# Patient Record
Sex: FEMALE | Race: WHITE
[De-identification: names, ages, dates, MRNs, and addresses within clinical notes are randomized per-mention and may not be internally consistent; named-entity substitution may affect disease eponyms.]

---

## 2018-07-31 NOTE — EKG
Test Date:    2018-07-31               Test Time:    01:22:52

Technician:   NILSA                                     

                                                     

MEASUREMENT RESULTS:                                       

Intervals:                                           

Rate:         80                                     

OH:           164                                    

QRSD:         80                                     

QT:           378                                    

QTc:          435                                    

Axis:                                                

P:            19                                     

OH:           164                                    

QRS:          4                                      

T:            19                                     

                                                     

INTERPRETIVE STATEMENTS:                                       

                                                     

Normal sinus rhythm

Normal ECG

Compared to ECG 03/09/1999 20:03:00

No significant changes



Electronically Signed On 07-31-18 14:45:21 CDT by Juan Candelaria

## 2018-07-31 NOTE — EDPHYS
Physician Documentation                                                                           

 [facility]                                                                

Name: Nora Elizondo                                                                          

Age: 22 yrs                                                                                       

Sex: Female                                                                                       

: 1995                                                                                   

MRN: [id number]                                                                                   

Arrival Date: 2018                                                                          

Time: 00:34                                                                                       

Account#: [id number]                                                                            

Bed 14                                                                                            

Private MD: Jonh Woods                                                                          

ED Physician Fortino Mcdermott                                                                      

HPI:                                                                                              

                                                                                             

01:16 This 22 yrs old  Female presents to ER via Ambulatory with complaints of Chest jr8 

      Pain, Cough.                                                                                

01:16 The patient or guardian reports cough, that is intermittent, described as mild, with no jr8 

      sputum. Onset: The symptoms/episode began/occurred gradually, 2 day(s) ago. Severity of     

      symptoms: At their worst the symptoms were moderate, in the emergency department the        

      symptoms are unchanged. Modifying factors: The symptoms are alleviated by nothing, the      

      symptoms are aggravated by nothing. Associated signs and symptoms: Pertinent positives:     

      chest pain, sore throat. The patient has not experienced similar symptoms in the past.      

      The patient has not recently seen a physician.                                              

                                                                                                  

OB/GYN:                                                                                           

01:02 LMP N/A - Birth control method                                                          aa1 

                                                                                                  

Historical:                                                                                       

- Allergies:                                                                                      

01:02 No Known Allergies;                                                                     aa1 

- Home Meds:                                                                                      

01:02 None [Active];                                                                          aa1 

- PMHx:                                                                                           

01:02 None;                                                                                   aa1 

- PSHx:                                                                                           

01:02 None;                                                                                   aa1 

                                                                                                  

- Immunization history:: Flu vaccine is not up to date.                                           

- Social history:: Smoking status: Patient/guardian denies using tobacco.                         

- Ebola Screening: : Patient denies exposure to infectious person Patient denies travel           

  to an Ebola-affected area in the 21 days before illness onset.                                  

                                                                                                  

                                                                                                  

ROS:                                                                                              

01:16 Eyes: Negative for injury, pain, redness, and discharge, Neck: Negative for injury,     jr8 

      pain, and swelling, Abdomen/GI: Negative for abdominal pain, nausea, vomiting,              

      diarrhea, and constipation, Back: Negative for injury and pain, MS/Extremity: Negative      

      for injury and deformity, Skin: Negative for injury, rash, and discoloration, Neuro:        

      Negative for headache, weakness, numbness, tingling, and seizure.                           

01:16 ENT: Positive for sore throat, Negative for drainage from ear(s), ear pain, tinnitus,       

      nasal discharge, rhinorrhea, sinus congestion, sinus pain, difficulty swallowing,           

      difficulty handling secretions, hoarseness.                                                 

01:16 Cardiovascular: Positive for chest pain, Negative for edema, orthopnea, palpitations,       

      paroxysmal nocturnal dyspnea.                                                               

01:16 Respiratory: Positive for cough, with no reported sputum, Negative for dyspnea on           

      exertion, hemoptysis, shortness of breath, sputum production, wheezing.                     

                                                                                                  

Exam:                                                                                             

02:09 Eyes:  Pupils equal round and reactive to light, extra-ocular motions intact.  Lids and jr8 

      lashes normal.  Conjunctiva and sclera are non-icteric and not injected.  Cornea within     

      normal limits.  Periorbital areas with no swelling, redness, or edema. ENT:  Nares          

      patent. No nasal discharge, no septal abnormalities noted.  Tympanic membranes are          

      normal and external auditory canals are clear.  Oropharynx with no redness, swelling,       

      or masses, exudates, or evidence of obstruction, uvula midline.  Mucous membranes           

      moist. Neck:  Trachea midline, no thyromegaly or masses palpated, and no cervical           

      lymphadenopathy.  Supple, full range of motion without nuchal rigidity, or vertebral        

      point tenderness.  No Meningismus. Cardiovascular:  Regular rate and rhythm with a          

      normal S1 and S2.  No gallops, murmurs, or rubs.  Normal PMI, no JVD.  No pulse             

      deficits. Respiratory:  Lungs have equal breath sounds bilaterally, clear to                

      auscultation and percussion.  No rales, rhonchi or wheezes noted.  No increased work of     

      breathing, no retractions or nasal flaring. Abdomen/GI:  Soft, non-tender, with normal      

      bowel sounds.  No distension or tympany.  No guarding or rebound.  No evidence of           

      tenderness throughout. Back:  No spinal tenderness.  No costovertebral tenderness.          

      Full range of motion. Skin:  Warm, dry with normal turgor.  Normal color with no            

      rashes, no lesions, and no evidence of cellulitis. MS/ Extremity:  Pulses equal, no         

      cyanosis.  Neurovascular intact.  Full, normal range of motion. Neuro:  Awake and           

      alert, GCS 15, oriented to person, place, time, and situation.  Cranial nerves II-XII       

      grossly intact.  Motor strength 5/5 in all extremities.  Sensory grossly intact.            

      Cerebellar exam normal.  Normal gait.                                                       

                                                                                                  

Vital Signs:                                                                                      

01:02  / 78; Pulse 91; Resp 18; Temp 98.7(O); Pulse Ox 100% on R/A; Weight 129.27 kg;   aa1 

      Height 5 ft. 5 in. (165.10 cm); Pain 6/10;                                                  

01:44  / 86; Pulse 83; Resp 18; Pulse Ox 98% on R/A;                                    aa1 

02:15  / 70; Pulse 74; Resp 18; Temp 98(O); Pulse Ox 98% on R/A; Pain 0/10;             aa1 

01:02 Body Mass Index 47.43 (129.27 kg, 165.10 cm)                                            1 

                                                                                                  

MDM:                                                                                              

00:43 Patient medically screened.                                                             8 

02:09 Data reviewed: vital signs, nurses notes, EKG, radiologic studies, plain films, and as  jr8 

      a result, I will discharge patient. Data interpreted: Pulse oximetry: on room air is 98     

      %. Interpretation: normal. Counseling: I had a detailed discussion with the patient         

      and/or guardian regarding: the historical points, exam findings, and any diagnostic         

      results supporting the discharge/admit diagnosis, radiology results, the need for           

      outpatient follow up, a family practitioner, to return to the emergency department if       

      symptoms worsen or persist or if there are any questions or concerns that arise at home.    

                                                                                                  

                                                                                             

01:43 Order name: Urine Dipstick--Ancillary (enter results)                                   [facility] 

                                                                                             

01:45 Order name: Urine Pregnancy--Ancillary (enter results)                                  [facility] 

                                                                                             

01:13 Order name: EKG - Nurse/Tech; Complete Time: 01:42                                      [facility] 

                                                                                             

01:13 Order name: XRAY Chest Pa And Lat (2 Views)                                             [facility] 

                                                                                             

01:13 Order name: Urine Pregnancy Test (obtain specimen); Complete Time: 01:42                [facility] 

                                                                                             

01:45 Order name: Urine Pregnancy--Ancillary                                                  [facility]

                                                                                             

01:13 Order name: Urine Dipstick-Ancillary (obtain specimen); Complete Time: 01:42            [facility] 

                                                                                                  

Administered Medications:                                                                         

No medications were administered                                                                  

                                                                                                  

                                                                                                  

Disposition:                                                                                      

20:33 Co-signature as Attending Physician, Fortino Mcdermott MD I agree with the assessment and  wa  

      plan of care.                                                                               

                                                                                                  

Disposition:                                                                                      

18 02:10 Discharged to Home. Impression: Acute upper respiratory infection, unspecified,    

  Chest pain on breathing.                                                                        

- Condition is Stable.                                                                            

- Discharge Instructions: Upper Respiratory Infection, Adult.                                     

- Prescriptions for Prednisone 20 mg Oral Tablet - take 1 tablet by ORAL route once               

  daily for 5 days; 5 tablet. Tessalon Perles 100 mg Oral Capsule - take 1 capsule by             

  ORAL route every 8 hours As needed; 15 capsule. Guaifenesin AC 10- 100 mg/5 mL Oral             

  Liquid - take 10 milliliter by ORAL route every 4 hours As needed; 240 milliliter.              

- Medication Reconciliation Form, Thank You Letter, Antibiotic Education, Prescription            

  Opioid Use form.                                                                                

- Follow up: Jonh Woods MD; When: 5 - 6 days; Reason: Recheck today's complaints,              

  Continuance of care, Re-evaluation by your physician.                                           

- Problem is new.                                                                                 

- Symptoms have improved.                                                                         

                                                                                                  

                                                                                                  

                                                                                                  

Signatures:                                                                                       

Dispatcher [employer]                           EDMS                                                 

Ernestina Orellana RN                        RN   aa1                                                  

Marcelino Durant PA                        PA   jr8                                                  

Fortino Mcdermott MD MD wa                                                   

                                                                                                  

Corrections: (The following items were deleted from the chart)                                    

02:30 02:10 2018 02:10 Discharged to Home. Impression: Acute upper respiratory          aa1 

      infection, unspecified; Chest pain on breathing. Condition is Stable. Forms are             

      Medication Reconciliation Form, Thank You Letter, Antibiotic Education, Prescription        

      Opioid Use. Follow up: Jonh Woods; When: 5 - 6 days; Reason: Recheck today's               

      complaints, Continuance of care, Re-evaluation by your physician. Problem is new.           

      Symptoms have improved. jr8                                                                 

                                                                                                  

**************************************************************************************************

## 2018-07-31 NOTE — ER
Nurse's Notes                                                                                     

 [facility]                                                                

Name: Nroa Elizondo                                                                          

Age: 22 yrs                                                                                       

Sex: Female                                                                                       

: 1995                                                                                   

MRN: [id number]                                                                                   

Arrival Date: 2018                                                                          

Time: 00:34                                                                                       

Account#: [id number]                                                                            

Bed 14                                                                                            

Private MD: Jonh Woods                                                                          

Diagnosis: Acute upper respiratory infection, unspecified;Chest pain on breathing                 

                                                                                                  

Presentation:                                                                                     

                                                                                             

00:59 Presenting complaint: Patient states: dry cough x 2 days and began to have tightness in aa1 

      her chest this evening. Denies fever. Transition of care: patient was not received from     

      another setting of care. Onset of symptoms was 2018. Risk Assessment: Do you       

      want to hurt yourself or someone else? Patient reports no desire to harm self or            

      others. Initial Sepsis Screen: Does the patient meet any 2 criteria? No. Patient's          

      initial sepsis screen is negative. Does the patient have a suspected source of              

      infection? No. Patient's initial sepsis screen is negative. Care prior to arrival: None.    

00:59 Method Of Arrival: Ambulatory                                                           aa1 

00:59 Acuity: FRANK 4                                                                           aa1 

                                                                                                  

OB/GYN:                                                                                           

01:02 LMP N/A - Birth control method                                                          aa1 

                                                                                                  

Historical:                                                                                       

- Allergies:                                                                                      

01:02 No Known Allergies;                                                                     aa1 

- Home Meds:                                                                                      

01:02 None [Active];                                                                          aa1 

- PMHx:                                                                                           

01:02 None;                                                                                   aa1 

- PSHx:                                                                                           

01:02 None;                                                                                   aa1 

                                                                                                  

- Immunization history:: Flu vaccine is not up to date.                                           

- Social history:: Smoking status: Patient/guardian denies using tobacco.                         

- Ebola Screening: : Patient denies exposure to infectious person Patient denies travel           

  to an Ebola-affected area in the 21 days before illness onset.                                  

                                                                                                  

                                                                                                  

Screenin:04 Abuse screen: Denies threats or abuse. Denies injuries from another. Nutritional        aa1 

      screening: No deficits noted. Tuberculosis screening: No symptoms or risk factors           

      identified. Fall Risk None identified.                                                      

                                                                                                  

Assessment:                                                                                       

01:04 Reassessment:. General: Appears in no apparent distress. comfortable, Behavior is calm, aa1 

      cooperative, appropriate for age. Pain: Complains of pain in chest Pain does not            

      radiate. Pain currently is 6 out of 10 on a pain scale. Quality of pain is described as     

      pressure, Is continuous. Neuro: Level of Consciousness is awake, alert, obeys commands,     

      Oriented to person, place, time, situation, Moves all extremities. Full function Gait       

      is steady, Speech is normal. Cardiovascular: Heart tones S1 S2 present Rhythm is            

      regular. Respiratory: Reports cough that is dry, Airway is patent Respiratory effort is     

      even, unlabored, Respiratory pattern is regular, symmetrical, Breath sounds are clear       

      bilaterally. GI: No signs and/or symptoms were reported involving the gastrointestinal      

      system. : No signs and/or symptoms were reported regarding the genitourinary system.      

      EENT: No signs and/or symptoms were reported regarding the EENT system. Derm: Skin is       

      intact, is healthy with good turgor, Skin is pink, warm \T\ dry. Musculoskeletal:           

      Circulation, motion, and sensation intact. Capillary refill < 3 seconds.                    

01:44 Reassessment: Patient appears in no apparent distress at this time. Patient and/or      aa1 

      family updated on plan of care and expected duration. Pain level reassessed. Patient is     

      alert, oriented x 3, equal unlabored respirations, skin warm/dry/pink. Awaiting xray.       

02:20 Reassessment: Patient and/or family updated on plan of care and expected duration. Pain aa1 

      level reassessed. Patient is alert, oriented x 3, equal unlabored respirations, skin        

      warm/dry/pink. Discharge instructions given to patient, verbalized the understanding of     

      instruction.                                                                                

                                                                                                  

Vital Signs:                                                                                      

01:02  / 78; Pulse 91; Resp 18; Temp 98.7(O); Pulse Ox 100% on R/A; Weight 129.27 kg;   aa1 

      Height 5 ft. 5 in. (165.10 cm); Pain 6/10;                                                  

01:44  / 86; Pulse 83; Resp 18; Pulse Ox 98% on R/A;                                    aa1 

02:15  / 70; Pulse 74; Resp 18; Temp 98(O); Pulse Ox 98% on R/A; Pain 0/10;             aa1 

01:02 Body Mass Index 47.43 (129.27 kg, 165.10 cm)                                            aa1 

                                                                                                  

ED Course:                                                                                        

00:34 Patient arrived in ED.                                                                  ds1 

00:34 Jonh Woods MD is Private Physician.                                                  ds1 

00:43 [facility]                                                               jr8 

00:43 Fortino Mcdermott MD is Attending Physician.                                             jr8 

00:53 Bland, Ernestina, RN is Primary Nurse.                                                      aa1 

01:00 Triage completed.                                                                       aa1 

01:02 Arm band placed on right wrist. Patient placed in an exam room, on a stretcher.         aa1 

01:04 Patient has correct armband on for positive identification. Bed in low position. Call   aa1 

      light in reach. Pulse ox on. NIBP on.                                                       

01:04 Patient maintains SpO2 saturation greater than 95% on room air.                         aa1 

01:52 Patient moved to radiology via wheelchair.                                              kw  

01:52 X-ray completed. Patient tolerated procedure well.                                      kw  

01:52 Patient moved back from radiology.                                                      kw  

01:53 XRAY Chest Pa And Lat (2 Views) In Process Unspecified.                                 EDMS

02:10 Jonh Woods MD is Referral Physician.                                                 jr8 

02:20 Patient did not have IV access during this emergency room visit.                        aa1 

02:27 No provider procedures requiring assistance completed.                                  aa1 

                                                                                                  

Administered Medications:                                                                         

No medications were administered                                                                  

                                                                                                  

                                                                                                  

Outcome:                                                                                          

02:10 Discharge ordered by MD.                                                                jr8 

02:27 Discharged to home ambulatory, with family.                                             aa1 

02:27 Condition: improved                                                                         

02:27 Discharge instructions given to patient, family, Instructed on discharge instructions,      

      follow up and referral plans. medication usage, Demonstrated understanding of               

      instructions, follow-up care, medications, Prescriptions given X 3.                         

02:30 Patient left the ED.                                                                    aa1 

                                                                                                  

Signatures:                                                                                       

Dispatcher [employer]                           EDMS                                                 

Ernestina Orellana RN RN   aa1                                                  

Rosi Mccollum                                ds1                                                  

Britney Robles Josh, PA PA   jr8                                                  

                                                                                                  

**************************************************************************************************

## 2018-07-31 NOTE — RAD REPORT
EXAM DESCRIPTION:  RAD - Chest Pa And Lat (2 Views) - 7/31/2018 1:57 am

 

CLINICAL HISTORY:  COUGH

Chest pain.

 

COMPARISON:  No comparisons

 

FINDINGS:  Mild prominence of the interstitial markings noted which could indicate interstitial pneum
onitis or reactive airway disease. No focal consolidation typical of bacterial pneumonia is seen. The
 heart is normal in size. No displaced fractures.

## 2018-08-23 NOTE — ER
Nurse's Notes                                                                                     

 [facility]                                                                

Name: Nora Elizondo                                                                          

Age: 22 yrs                                                                                       

Sex: Female                                                                                       

: 1995                                                                                   

MRN: [id number]                                                                                   

Arrival Date: 2018                                                                          

Time: 13:36                                                                                       

Account#: [id number]                                                                            

Bed 12                                                                                            

Private MD:                                                                                       

Diagnosis: Left Ear Pain;Rash and other nonspecific skin eruption                                 

                                                                                                  

Presentation:                                                                                     

                                                                                             

14:04 Presenting complaint: Patient states: Well a couple days ago my left ear was bothering  iw  

      me, now my right ear is hurting too, i also have a sore throat. Transition of care:         

      patient was not received from another setting of care. Onset of symptoms was 2018. Risk Assessment: Do you want to hurt yourself or someone else? Patient reports no     

      desire to harm self or others. Initial Sepsis Screen: Does the patient meet any 2           

      criteria? No. Patient's initial sepsis screen is negative. Does the patient have a          

      suspected source of infection? No. Patient's initial sepsis screen is negative. Care        

      prior to arrival: None.                                                                     

14:04 Acuity: FRANK 5                                                                           iw  

14:04 Method Of Arrival: Ambulatory                                                           iw  

                                                                                                  

Triage Assessment:                                                                                

14:30 General: Appears in no apparent distress.                                               iw  

14:30 General: Behavior is calm, cooperative.                                                 iw  

                                                                                                  

Historical:                                                                                       

- Allergies:                                                                                      

14:05 No Known Allergies;                                                                     iw  

- Home Meds:                                                                                      

14:05 None [Active];                                                                          iw  

- PMHx:                                                                                           

14:05 None;                                                                                   iw  

- PSHx:                                                                                           

14:04 None;                                                                                   iw  

                                                                                                  

- Immunization history:: Adult Immunizations up to date.                                          

- Social history:: Smoking status: Patient/guardian denies using tobacco.                         

- Ebola Screening: : Patient negative for fever greater than or equal to 101.5 degrees            

  Fahrenheit, and additional compatible Ebola Virus Disease symptoms Patient denies               

  exposure to infectious person Patient denies travel to an Ebola-affected area in the            

  21 days before illness onset No symptoms or risks identified at this time.                      

- Family history:: not pertinent.                                                                 

- Hospitalizations: : No recent hospitalization is reported.                                      

                                                                                                  

                                                                                                  

Screenin:35 Abuse screen: Denies threats or abuse. Denies injuries from another. Nutritional        iw  

      screening: No deficits noted. Tuberculosis screening: No symptoms or risk factors           

      identified. Fall Risk None identified.                                                      

                                                                                                  

Assessment:                                                                                       

14:30 General: Appears in no apparent distress. Behavior is calm, cooperative. Pain:          iw  

      Complains of pain in abdomen and umbilical area and right ear. Neuro: Level of              

      Consciousness is awake, alert, obeys commands, Oriented to person, place, time,             

      situation, Moves all extremities. Full function. Cardiovascular: Patient's skin is warm     

      and dry. Respiratory: Respiratory effort is even, unlabored, Respiratory pattern is         

      regular, symmetrical. EENT: Reports pain in right ear and left ear. Derm: Skin is           

      intact. Musculoskeletal: Range of motion: intact in all extremities.                        

                                                                                                  

Vital Signs:                                                                                      

14:20  / 85; Pulse 74; Resp 16; Temp 98.2; Pulse Ox 98% on R/A; Pain 5/10;              iw  

                                                                                                  

ED Course:                                                                                        

13:36 Patient arrived in ED.                                                                  rg4 

13:46 [facility]                                                           kav 

13:46 Fortino Mcdermott MD is Attending Physician.                                             kav 

14:03 Anel Meadows, RN is Primary Nurse.                                                   iw  

14:04 Triage completed.                                                                       iw  

14:05 Arm band placed on.                                                                     iw  

14:30 Patient has correct armband on for positive identification.                             iw  

14:30 No provider procedures requiring assistance completed. Patient did not have IV access   iw  

      during this emergency room visit.                                                           

                                                                                                  

Administered Medications:                                                                         

No medications were administered                                                                  

                                                                                                  

                                                                                                  

Outcome:                                                                                          

14:22 Discharge ordered by MD.                                                                kav 

14:39 Discharged to home ambulatory, with family.                                             iw  

14:39 Condition: good                                                                             

14:39 Discharge instructions given to patient, family, Instructed on discharge instructions,      

      follow up and referral plans. medication usage, Demonstrated understanding of               

      instructions, follow-up care, medications, Prescriptions given X 1.                         

14:40 Patient left the ED.                                                                    iw  

                                                                                                  

Signatures:                                                                                       

Liana Vega FNP FNP kav Williams, Irene, RN                     Elida Purvis                                 rg4                                                  

                                                                                                  

**************************************************************************************************

## 2018-08-23 NOTE — EDPHYS
Physician Documentation                                                                           

 [facility]                                                                

Name: Nora Elizondo                                                                          

Age: 22 yrs                                                                                       

Sex: Female                                                                                       

: 1995                                                                                   

MRN: [id number]                                                                                   

Arrival Date: 2018                                                                          

Time: 13:36                                                                                       

Account#: [id number]                                                                            

Bed 12                                                                                            

Private MD:                                                                                       

ED Physician Fortino Mcdermott                                                                      

HPI:                                                                                              

                                                                                             

13:47 This 22 yrs old  Female presents to ER via Unassigned with complaints of Ear   kav 

      Pain.                                                                                       

14:14 The patient presents with a fullness. The complaints affect the right ear. Onset: The   kav 

      symptoms/episode began/occurred acutely, 2 day(s) ago. Modifying factors: The symptoms      

      are alleviated by covering ear, the symptoms are aggravated by "...putting q-tips in        

      ear". Associated signs and symptoms: The patient has no apparent associated signs or        

      symptoms. Severity of symptoms: At their worst the symptoms were moderate just prior to     

      arrival. The patient has not experienced similar symptoms in the past. The patient has      

      not recently seen a physician. Patient reports that she also has a rash on her abdomen      

      around periumbilical area..                                                                 

                                                                                                  

Historical:                                                                                       

- Allergies:                                                                                      

14:05 No Known Allergies;                                                                     iw  

- Home Meds:                                                                                      

14:05 None [Active];                                                                          iw  

- PMHx:                                                                                           

14:05 None;                                                                                   iw  

- PSHx:                                                                                           

14:04 None;                                                                                   iw  

                                                                                                  

- Immunization history:: Adult Immunizations up to date.                                          

- Social history:: Smoking status: Patient/guardian denies using tobacco.                         

- Ebola Screening: : Patient negative for fever greater than or equal to 101.5 degrees            

  Fahrenheit, and additional compatible Ebola Virus Disease symptoms Patient denies               

  exposure to infectious person Patient denies travel to an Ebola-affected area in the            

  21 days before illness onset No symptoms or risks identified at this time.                      

- Family history:: not pertinent.                                                                 

- Hospitalizations: : No recent hospitalization is reported.                                      

                                                                                                  

                                                                                                  

ROS:                                                                                              

14:16 Constitutional: Negative for fever, chills, and weight loss, Eyes: Negative for injury, kav 

      pain, redness, and discharge, Neck: Negative for injury, pain, and swelling,                

      Cardiovascular: Negative for chest pain, palpitations, and edema, Respiratory: Negative     

      for shortness of breath, cough, wheezing, and pleuritic chest pain, Abdomen/GI:             

      Negative for abdominal pain, nausea, vomiting, diarrhea, and constipation, Back:            

      Negative for injury and pain, : Negative for injury, bleeding, discharge, and             

      swelling, MS/Extremity: Negative for injury and deformity, Neuro: Negative for              

      headache, weakness, numbness, tingling, and seizure, Psych: Negative for depression,        

      anxiety, suicide ideation, homicidal ideation, and hallucinations, Allergy/Immunology:      

      Negative for hives, rash, and allergies, Endocrine: Negative for neck swelling,             

      polydipsia, polyuria, polyphagia, and marked weight changes, Hematologic/Lymphatic:         

      Negative for swollen nodes, abnormal bleeding, and unusual bruising.                        

14:16 ENT: Positive for ear pain, of the right ear, Negative for sinus congestion.                

14:16 Skin: Positive for rash.                                                                    

                                                                                                  

Exam:                                                                                             

14:16 Constitutional:  This is a well developed, well nourished patient who is awake, alert,  kav 

      and in no acute distress. Head/Face:  Normocephalic, atraumatic. Eyes:  Pupils equal        

      round and reactive to light, extra-ocular motions intact.  Lids and lashes normal.          

      Conjunctiva and sclera are non-icteric and not injected.  Cornea within normal limits.      

      Periorbital areas with no swelling, redness, or edema. Neck:  Trachea midline, no           

      thyromegaly or masses palpated, and no cervical lymphadenopathy.  Supple, full range of     

      motion without nuchal rigidity, or vertebral point tenderness.  No Meningismus.             

      Chest/axilla:  Normal chest wall appearance and motion.  Nontender with no deformity.       

      No lesions are appreciated. Cardiovascular:  Regular rate and rhythm with a normal S1       

      and S2.  No gallops, murmurs, or rubs.  Normal PMI, no JVD.  No pulse deficits.             

      Respiratory:  Lungs have equal breath sounds bilaterally, clear to auscultation and         

      percussion.  No rales, rhonchi or wheezes noted.  No increased work of breathing, no        

      retractions or nasal flaring. Abdomen/GI:  Soft, non-tender, with normal bowel sounds.      

      No distension or tympany.  No guarding or rebound.  No evidence of tenderness               

      throughout. Back:  No spinal tenderness.  No costovertebral tenderness.  Full range of      

      motion. MS/ Extremity:  Pulses equal, no cyanosis.  Neurovascular intact.  Full, normal     

      range of motion. Neuro:  Awake and alert, GCS 15, oriented to person, place, time, and      

      situation.  Cranial nerves II-XII grossly intact.  Motor strength 5/5 in all                

      extremities.  Sensory grossly intact.  Cerebellar exam normal.  Normal gait. Psych:         

      Awake, alert, with orientation to person, place and time.  Behavior, mood, and affect       

      are within normal limits.                                                                   

14:16 ENT: External ear(s): are unremarkable, no acute changes, Ear canal(s): TM's: erythema,     

      that is moderate, on the right, Examination of the other ear shows no obvious               

      abnormality, Nose: is normal, no acute changes, Examination of the other nostril shows      

      no obvious abnormality.                                                                     

14:16 Skin: ringworm, on the umbilical area.                                                      

                                                                                                  

Vital Signs:                                                                                      

14:20  / 85; Pulse 74; Resp 16; Temp 98.2; Pulse Ox 98% on R/A; Pain 5/10;              iw  

                                                                                                  

MDM:                                                                                              

13:47 Medical screening is not applicable.                                                    kav 

14:16 Data reviewed: vital signs, nurses notes.                                               kav 

                                                                                                  

Administered Medications:                                                                         

No medications were administered                                                                  

                                                                                                  

                                                                                                  

Disposition:                                                                                      

18 14:22 Discharged to Home. Impression: Left Ear Pain, Rash and other nonspecific skin     

  eruption.                                                                                       

- Condition is Stable.                                                                            

- Discharge Instructions: Earache, Adult, Rash, Body Ringworm.                                    

- Prescriptions for Ciprodex 0.3- 0.1 % Otic Drops, Suspension - instill 4 drop by [facility]           

  route every 12 hours for 7 days , for ears ONLY; 1 Container.                                   

- Medication Reconciliation Form, Thank You Letter, Antibiotic Education, Prescription            

  Opioid Use form.                                                                                

- Follow up: Private Physician; When: 2 - 3 days; Reason: Recheck today's complaints,             

  Continuance of care, Re-evaluation by your physician.                                           

- Problem is new.                                                                                 

- Symptoms have improved.                                                                         

                                                                                                  

                                                                                                  

                                                                                                  

Addendum:                                                                                         

2018                                                                                        

     08:07 Co-signature as Attending Physician, Fortino Mcdermott MD I agree with the assessment and  w
a

           plan of care.                                                                          

                                                                                                  

Signatures:                                                                                       

Liana Vega, FELIPEP                    FNP  Anel Benitez RN RN iw [place], William, MD MD   wa                                                   

                                                                                                  

Corrections: (The following items were deleted from the chart)                                    

                                                                                             

14:40 14:22 2018 14:22 Discharged to Home. Impression: Left Ear Pain; Rash and other    iw  

      nonspecific skin eruption. Condition is Stable. Forms are Medication Reconciliation         

      Form, Thank You Letter, Antibiotic Education, Prescription Opioid Use. Follow up:           

      Private Physician; When: 2 - 3 days; Reason: Recheck today's complaints, Continuance of     

      care, Re-evaluation by your physician. Problem is new. Symptoms have improved. deovnte          

                                                                                                  

**************************************************************************************************

## 2019-09-09 NOTE — XMS REPORT
Summary of Care

 Created on:2019



Patient:Nora Elizondo

Sex:Female

:1995

External Reference #:[id number]





Demographics







 Address  [address] [place] [address]

 

 Phone  [phone]

 

 Mobile Phone  [phone]

 

 Home Phone  [phone]

 

 Email Address  [email or web address]

 

 Preferred Language  English

 

 Marital Status  Single

 

 Mandaeism Affiliation  Unknown

 

 Race  White

 

 Ethnic Group  Not  or 









Author







 Organization  [facility]

 

 Address  [address] [place] [address]









Care Team Providers







 Name  Role  Phone

 

 [facility]  Primary Care Provider  [phone]









Encounter Details







 Date  Type  Department  Care Team  Description

 

 2019  Orders Only  [facility]



  Doctor Unassigned, No  



     [address] [facility]



  Name



  



     [place] [address]  [address] [place] [address]  







Allergies

No Known Allergiesdocumented as of this encounter (statuses as of 2019)



Medications







 Medication  Sig  Dispensed  Refills  Start Date  End Date  Status

 

 norgestimate-ethinyl  Take 1 tablet by    0      Active



 estradiol (FEMYNOR)  mouth daily.          



 0.25-35 mg-mcg per            



 tablet            



documented as of this encounter (statuses as of 2019)



Active Problems







 Problem  Noted Date

 

 Nexplanon in place  2018









 Overview: 







 Removal date 21









 Need for varicella vaccine  2018

 

 Dysmenorrhea  2016

 

 Morbid obesity  2013









 Overview: 







 ICD10 Diagnosis Term Replacer Utility



documented as of this encounter (statuses as of 2019)



Resolved Problems







 Problem  Noted Date  Resolved Date

 

 Well woman exam  2016

 

 Contraceptive management  2016

 

 Depot contraception  2016



documented as of this encounter (statuses as of 2019)



Immunizations







 Name  Administration Dates  Next Due

 

 HPV9  2018  

 

 TDAP (ADACEL) VACCINE  2012  



documented as of this encounter



Social History







 Tobacco Use  Types  Packs/Day  Years Used  Date

 

 Never Smoker        









 Smokeless Tobacco: Never Used      









 Alcohol Use  Drinks/Week  oz/Week  Comments

 

 No  0 Standard drinks or equivalent  0.0  









 Sex Assigned at Birth  Date Recorded

 

 Not on file  









 Job Start Date  Occupation  Industry

 

 Not on file  Not on file  Not on file









 Travel History  Travel Start  Travel End









 No recent travel history available.



documented as of this encounter



Last Filed Vital Signs

Not on filedocumented in this encounter



Plan of Treatment







 Date  Type  Specialty  Care Team  Description

 

 2019  Office Visit  [facility]  Rea Reagan, FNP



  



       [address] [place], [place] [address] [phone] (Fax)  









 Health Maintenance  Due Date  Last Done  Comments

 

 MENINGOCOCCAL B VACCINES (1  2007    



 of 2 - Risk Bexsero 2-dose      



 series)      

 

 VARICELLA VACCINES (1 of 2 -  2010    



 13+ 2-dose series)      

 

 HPV VACCINES (2 - Female  2018  



 3-dose series)      

 

 PAP SMEAR  2018    

 

 CHLAMYDIA SCREENING  2019, 12/15/2016,  



     2016, Additional  



     history exists  

 

 INFLUENZA VACCINE  2019    

 

 DTaP,Tdap,and Td Vaccines (2  2022  



 - Td)      

 

 MENINGOCOCCAL VACCINE  Aged Out    No longer eligible



       based on patient's age



       to complete this topic

 

 PNEUMOCOCCAL 0-64 YEARS  Aged Out    No longer eligible



 COMBINED SERIES      based on patient's age



       to complete this topic



documented as of this encounter



Procedures







 Procedure Name  Priority  Date/Time  Associated Diagnosis  Comments

 

 ASSIGNMENT OF BENEFITS  Routine  2019  8:00 AM CDT    

 

 ASSIGNMENT OF BENEFITS  Routine  2019  8:00 AM CDT    



documented in this encounter



Results

Not on filedocumented in this encounter



Insurance







 Payer  Benefit Plan  Subscriber ID  Effective Dates  Phone  Address  Type



   / Group          

 

 [facility]  [id number]  2018-Preeti  [phone]  [address]  
PP[address]/POS



 [place]  - OUT OF      [address]  [id number]



  



   [place]  



           [address]  



documented as of this encounter



Advance Directives







 Name  Relationship  Healthcare Agent Relationship  Communication

 

 Kia Elizondo  Grandparent  Primary healthcare agent  [phone] (Mobile)

## 2019-09-09 NOTE — EDPHYS
Physician Documentation                                                                           

 [facility]                                                                 

Name: Nora Elizondo                                                                          

Age: 23 yrs                                                                                       

Sex: Female                                                                                       

: 1995                                                                                   

MRN: [id number]                                                                                   

Arrival Date: 2019                                                                          

Time: 07:19                                                                                       

Account#: [id number]                                                                            

Bed 18                                                                                            

Private MD: Jonh Woods                                                                          

ED Physician Manny Russo                                                                         

HPI:                                                                                              

                                                                                             

07:28 This 23 yrs old  Female presents to ER via Unassigned with complaints of       rn  

      Vaginal Pain.                                                                               

07:28 The patient presents with perineal itching. Onset: The symptoms/episode began/occurred  rn  

      2 day(s) ago. Modifying factors: The symptoms are alleviated by nothing, the symptoms       

      are aggravated by movement, pressure, urinating. Severity of symptoms: At their worst       

      the symptoms were moderate, in the emergency department the symptoms are unchanged. The     

      patient has not experienced similar symptoms in the past. The patient has not recently      

      seen a physician. Reports vaginal pain and itching, began 2 days ago, no fever, no          

      trauma, reports began with itching and now has worsened to hurt when urinating and          

      walking/sitting. .                                                                          

                                                                                                  

Historical:                                                                                       

- Allergies:                                                                                      

07:35 No Known Allergies;                                                                     ss  

- Home Meds:                                                                                      

07:35 None [Active];                                                                          ss  

- PMHx:                                                                                           

07:35 None;                                                                                   ss  

- PSHx:                                                                                           

07:35 None;                                                                                   ss  

                                                                                                  

- Immunization history:: Adult Immunizations up to date.                                          

- Social history:: Smoking status: Patient/guardian denies using tobacco.                         

- Family history:: not pertinent.                                                                 

- Ebola Screening: : Patient denies exposure to infectious person Patient denies travel           

  to an Ebola-affected area in the 21 days before illness onset.                                  

- Hospitalizations: : No recent hospitalization is reported.                                      

                                                                                                  

                                                                                                  

ROS:                                                                                              

07:31 Constitutional: Negative for fever, chills, and weight loss, Eyes: Negative for injury, rn  

      pain, redness, and discharge, Cardiovascular: Negative for chest pain, palpitations,        

      and edema, Respiratory: Negative for shortness of breath, cough, wheezing, and              

      pleuritic chest pain, Abdomen/GI: Negative for abdominal pain, nausea, vomiting,            

      diarrhea, and constipation, : Negative for injury, swelling MS/Extremity: Negative        

      for injury and deformity, Skin: Negative for injury, rash, and discoloration, Neuro:        

      Negative for headache, weakness, numbness, tingling, and seizure.                           

                                                                                                  

Exam:                                                                                             

07:31 Constitutional:  This is a well developed, well nourished patient who is awake, alert,  rn  

      and in no acute distress. Abdomen/GI:  soft, non-tender, no rebound Pelvic Exam:  +         

      inflamed and erythematous vaginal mucosa and labia without thick discharge, no foul         

      odor. NO signs of trauma.  No abscess or fluctuance. Skin:  Warm, dry with normal           

      turgor.  Normal color with no rashes, no lesions, and no evidence of cellulitis.            

                                                                                                  

Vital Signs:                                                                                      

07:35  / 89; Pulse 80; Resp 16; Temp 98.3(TE); Pulse Ox 98% on R/A; Weight 131.54 kg;   ss  

      Height 5 ft. 5 in. (165.10 cm); Pain 8/10;                                                  

07:35 Body Mass Index 48.26 (131.54 kg, 165.10 cm)                                            ss  

                                                                                                  

MDM:                                                                                              

07:20 Patient medically screened.                                                             rn  

08:11 Differential diagnosis: urinary tract infection, vaginosis, vaginitis. Data reviewed:   rn  

      vital signs, nurses notes.                                                                  

08:12 Counseling: I had a detailed discussion with the patient and/or guardian regarding: the rn  

      historical points, exam findings, and any diagnostic results supporting the                 

      discharge/admit diagnosis, lab results, the need for outpatient follow up, to return to     

      the emergency department if symptoms worsen or persist or if there are any questions or     

      concerns that arise at home. Special discussion: I discussed with the patient/guardian      

      in detail that at this point there is no indication for admission to the hospital. It       

      is understood, however, that if the symptoms persist or worsen the patient needs to         

      return immediately for re-evaluation.                                                       

                                                                                                  

                                                                                             

07:50 Order name: Urine Dipstick--Ancillary (enter results); Complete Time: 08:23             bd  

                                                                                             

07:50 Order name: Urine Pregnancy--Ancillary (enter results); Complete Time: 08:23            bd  

                                                                                             

07:31 Order name: Urine Dipstick-Ancillary (obtain specimen); Complete Time: 07:49            rn  

                                                                                             

07:31 Order name: Urine Pregnancy Test (obtain specimen); Complete Time: 07:49                rn  

                                                                                                  

Administered Medications:                                                                         

07:45 CANCELLED (will wait on preg test): DiFLUcan 150 mg PO once                             rn  

08:28 Drug: DiFLUcan 200 mg Route: PO;                                                        ss  

08:28 Follow up: Response: Medication administered at discharge.                              ss  

                                                                                                  

                                                                                                  

Disposition:                                                                                      

19 08:12 Discharged to Home. Impression: Vaginitis, vulvitis and vulvovaginitis in          

  diseases classified elsewhere.                                                                  

- Condition is Stable.                                                                            

- Discharge Instructions: Vaginitis.                                                              

- Prescriptions for Flagyl 500 mg Oral Tablet - take 1 tablet by ORAL route every 12              

  hours for 7 days; 14 tablet. Miconazole 7 2 % Vaginal Cream - insert 1 applicatorful            

  by VAGINAL route At bedtime for 7 days; 45 gram.                                                

- Medication Reconciliation Form, Thank You Letter, Antibiotic Education, Prescription            

  Opioid Use form.                                                                                

- Follow up: Private Physician; When: 2 - 3 days; Reason: Recheck today's complaints,             

  Re-evaluation by your physician.                                                                

- Problem is new.                                                                                 

- Symptoms are unchanged.                                                                         

                                                                                                  

                                                                                                  

                                                                                                  

Signatures:                                                                                       

Dispatcher [employer]                           EDMS                                                 

Manny Russo MD MD rn Smirch, Shelby, RN RN   ss                                                   

                                                                                                  

Corrections: (The following items were deleted from the chart)                                    

07:41 07:31 Constitutional: This is a well developed, well nourished patient who is awake,    rn  

      alert, and in no acute distress. Abdomen/GI: soft, non-tender, no rebound Pelvic Exam:      

      + inflamed and erythematous vaginal mucosa without thick discharge, no foul odor. NO        

      signs of trauma. Skin: Warm, dry with normal turgor. Normal color with no rashes, no        

      lesions, and no evidence of cellulitis. rn                                                  

07:45 07:45 DiFLUcan 150 mg PO once ordered. rn                                               rn  

08:28 08:12 2019 08:12 Discharged to Home. Impression: Vaginitis, vulvitis and          ss  

      vulvovaginitis in diseases classified elsewhere. Condition is Stable. Discharge             

      Instructions: Vaginitis. Prescriptions for Flagyl 500 mg Oral Tablet - take 1 tablet by     

      ORAL route every 12 hours for 7 days; 14 tablet, Miconazole 7 2 % Vaginal Cream -           

      insert 1 applicatorful by VAGINAL route At bedtime for 7 days; 45 gram. and Forms are       

      Medication Reconciliation Form, Thank You Letter, Antibiotic Education, Prescription        

      Opioid Use. Follow up: Private Physician; When: 2 - 3 days; Reason: Recheck today's         

      complaints, Re-evaluation by your physician. Problem is new. Symptoms are unchanged. rn     

                                                                                                  

**************************************************************************************************
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
## 2019-09-09 NOTE — ER
Nurse's Notes                                                                                     

 [facility]                                                                 

Name: Nora Elizondo                                                                          

Age: 23 yrs                                                                                       

Sex: Female                                                                                       

: 1995                                                                                   

MRN: [id number]                                                                                   

Arrival Date: 2019                                                                          

Time: 07:19                                                                                       

Account#: [id number]                                                                            

Bed 18                                                                                            

Private MD: Jonh Woods                                                                          

Diagnosis: Vaginitis, vulvitis and vulvovaginitis in diseases classified elsewhere                

                                                                                                  

Presentation:                                                                                     

                                                                                             

07:19 Presenting complaint: Patient states: vaginal itching x 3 days. Patient is concerned    ss  

      because she believes she may have scratched herself because it burns when she voids x 1     

      day. Transition of care: patient was not received from another setting of care. Onset       

      of symptoms was 2019. Risk Assessment: Do you want to hurt yourself or        

      someone else? Patient reports no desire to harm self or others. Initial Sepsis Screen:      

      Does the patient meet any 2 criteria? No. Patient's initial sepsis screen is negative.      

      Does the patient have a suspected source of infection? No. Patient's initial sepsis         

      screen is negative. Care prior to arrival: None.                                            

07:19 Method Of Arrival: Ambulatory                                                           ss  

07:19 Acuity: FRANK 4                                                                           ss  

                                                                                                  

Historical:                                                                                       

- Allergies:                                                                                      

07:35 No Known Allergies;                                                                     ss  

- Home Meds:                                                                                      

07:35 None [Active];                                                                          ss  

- PMHx:                                                                                           

07:35 None;                                                                                   ss  

- PSHx:                                                                                           

07:35 None;                                                                                   ss  

                                                                                                  

- Immunization history:: Adult Immunizations up to date.                                          

- Social history:: Smoking status: Patient/guardian denies using tobacco.                         

- Family history:: not pertinent.                                                                 

- Ebola Screening: : Patient denies exposure to infectious person Patient denies travel           

  to an Ebola-affected area in the 21 days before illness onset.                                  

- Hospitalizations: : No recent hospitalization is reported.                                      

                                                                                                  

                                                                                                  

Screenin:40 Abuse screen: Denies threats or abuse. Denies injuries from another. Nutritional        ss  

      screening: No deficits noted. Tuberculosis screening: Never had TB. Fall Risk None          

      identified.                                                                                 

                                                                                                  

Assessment:                                                                                       

07:19 General: Appears in no apparent distress. comfortable, Behavior is calm, cooperative,   ss  

      Denies fever, feeling ill, fatigue, chills. Pain: Complains of pain in vaginal area         

      Pain currently is 8 out of 10 on a pain scale. Quality of pain is described as itching,     

      burning, tender Pain began 3 days ago Is continuous. Neuro: Level of Consciousness is       

      awake, alert, obeys commands, Oriented to person, place, time, situation.                   

      Cardiovascular: Capillary refill < 3 seconds is brisk in bilateral fingers.                 

      Respiratory: Airway is patent Respiratory effort is even, unlabored, Respiratory            

      pattern is regular, symmetrical. GI: Patient currently denies abdominal pain, diarrhea,     

      nausea, vomiting. : Reports burning with urination, vaginal itching, Denies cramping      

      discharge, incontinence. EENT: Oral mucosa is moist. Derm: Skin is pink, warm \T\ dry.      

      normal. Musculoskeletal: Circulation, motion, and sensation intact. Range of motion:        

      intact in all extremities, Swelling absent.                                                 

07:40 Reassessment: Chaperoned Dr. Russo with external vaginal exam with family member at       

      bedside upon patient request. Patient tolerated well.                                       

                                                                                                  

Vital Signs:                                                                                      

07:35  / 89; Pulse 80; Resp 16; Temp 98.3(TE); Pulse Ox 98% on R/A; Weight 131.54 kg;   ss  

      Height 5 ft. 5 in. (165.10 cm); Pain 8/10;                                                  

07:35 Body Mass Index 48.26 (131.54 kg, 165.10 cm)                                              

                                                                                                  

ED Course:                                                                                        

07:19 Patient arrived in ED.                                                                  mr  

07:20 Jonh Woods MD is Private Physician.                                                  mr  

07:20 Manny Russo MD is Attending Physician.                                                rn  

07:21 Chris Dillon, RN is Primary Nurse.                                                    bp  

07:34 Triage completed.                                                                       ss  

07:35 Arm band placed on right wrist.                                                         ss  

07:40 Patient has correct armband on for positive identification. Bed in low position. Call     

      light in reach.                                                                             

07:40 Assist provider with pelvic exam: Patient tolerated well. Thermoregulation: warm        ss  

      blanket given to patient.                                                                   

07:43 Ese Enriquez, RN is Primary Nurse.                                                    ss  

08:28 Patient did not have IV access during this emergency room visit.                        ss  

                                                                                                  

Administered Medications:                                                                         

07:45 CANCELLED (will wait on preg test): DiFLUcan 150 mg PO once                             rn  

08:28 Drug: DiFLUcan 200 mg Route: PO;                                                        ss  

08:28 Follow up: Response: Medication administered at discharge.                                

                                                                                                  

                                                                                                  

Outcome:                                                                                          

08:12 Discharge ordered by MD.                                                                rn  

08:28 Discharged to home ambulatory, with family.                                             ss  

08:28 Condition: good                                                                             

08:28 Discharge instructions given to patient, family, Instructed on discharge instructions,      

      follow up and referral plans. medication usage, Demonstrated understanding of               

      instructions, follow-up care, medications, Prescriptions given X 2.                         

08:28 Patient left the ED.                                                                      

                                                                                                  

Signatures:                                                                                       

Miroslava Malone Roman, MD MD rn Smirch, Shelby, RN                      RN   ss                                                   

Chris Dillon RN                      RN   bp                                                   

                                                                                                  

**************************************************************************************************

## 2019-09-09 NOTE — XMS REPORT
Patient Summary Document

 Created on:2019



Patient:OLLIE FELIPE

Sex:Female

:1995

External Reference #:[id number]





Demographics







 Address  [address] [address]



   [place] [address]

 

 Home Phone  [phone]

 

 Work Phone  [phone]

 

 Email Address  NONE

 

 Preferred Language  Unknown

 

 Marital Status  Unknown

 

 Sabianism Affiliation  Unknown

 

 Race  Unknown

 

 Additional Race(s)  Unavailable

 

 Ethnic Group  Unknown









Author







 Organization  [facility]

 

 Address  [address]



   [place] [address]

 

 Phone  [phone]









Care Team Providers







 Name  Role  Phone

 

 Unavailable  Unavailable  Unavailable









Problems

This patient has no known problems.



Allergies, Adverse Reactions, Alerts

This patient has no known allergies or adverse reactions.



Medications

This patient has no known medications.

## 2019-12-25 ENCOUNTER — HOSPITAL ENCOUNTER (EMERGENCY)
Dept: HOSPITAL 97 - ER | Age: 24
LOS: 1 days | Discharge: HOME | End: 2019-12-26
Payer: COMMERCIAL

## 2019-12-25 DIAGNOSIS — J20.9: Primary | ICD-10-CM

## 2019-12-25 PROCEDURE — 87070 CULTURE OTHR SPECIMN AEROBIC: CPT

## 2019-12-25 PROCEDURE — 87081 CULTURE SCREEN ONLY: CPT

## 2019-12-25 PROCEDURE — 87804 INFLUENZA ASSAY W/OPTIC: CPT

## 2019-12-25 PROCEDURE — 99283 EMERGENCY DEPT VISIT LOW MDM: CPT

## 2019-12-25 NOTE — XMS REPORT
Patient Summary Document

 Created on:2019



Patient:OLLIE FELIPE

Sex:Female

:1995

External Reference #:[id number]





Demographics







 Address  [address] [place] [address]

 

 Home Phone  [phone]

 

 Work Phone  [phone]

 

 Preferred Language  Unknown

 

 Marital Status  Unknown

 

 Shinto Affiliation  Unknown

 

 Race  Unknown

 

 Additional Race(s)  Unavailable

 

 Ethnic Group  Unknown









Author







 Organization  [facility]

 

 Address  [address] [place] [address] [address]. Escalante [address] [place] [address]

 

 Phone  [phone]









Care Team Providers







 Name  Role  Phone

 

 Unavailable  Unavailable  Unavailable









Problems

This patient has no known problems.



Allergies, Adverse Reactions, Alerts

This patient has no known allergies or adverse reactions.



Medications

This patient has no known medications.

## 2019-12-26 VITALS — DIASTOLIC BLOOD PRESSURE: 83 MMHG | OXYGEN SATURATION: 99 % | SYSTOLIC BLOOD PRESSURE: 129 MMHG

## 2019-12-26 VITALS — TEMPERATURE: 98.1 F

## 2019-12-26 NOTE — EDPHYS
Physician Documentation                                                                           

 [facility]                                                                 

Name: Nora Elizondo                                                                          

Age: 23 yrs                                                                                       

Sex: Female                                                                                       

: 1995                                                                                   

MRN: [id number]                                                                                   

Arrival Date: 2019                                                                          

Time: 22:16                                                                                       

Account#: [id number]                                                                            

Bed 5                                                                                             

Private MD:                                                                                       

ED Physician Alexander Soliman                                                                      

HPI:                                                                                              

                                                                                             

00:24 This 23 yrs old  Female presents to ER via Ambulatory with complaints of Sore  snw 

      Throat, Cough.                                                                              

00:24 The patient presents with sore throat. The patient describes throat pain as raw. Onset: snw 

      The symptoms/episode began/occurred suddenly, 2 day(s) ago, and became persistent.          

      Severity of symptoms: At their worst the symptoms were moderate. Associated signs and       

      symptoms: Pertinent positives: cough. It is unknown whether or not the patient has had      

      similar symptoms in the past. It is unknown whether or not the patient has recently         

      seen a physician.                                                                           

                                                                                                  

OB/GYN:                                                                                           

                                                                                             

23:06 LMP N/A - Depo-provera                                                                  tl1 

                                                                                                  

Historical:                                                                                       

- Allergies:                                                                                      

23:06 No Known Allergies;                                                                     tl1 

- Home Meds:                                                                                      

23:06 None [Active];                                                                          tl1 

- PMHx:                                                                                           

23:06 None;                                                                                   tl1 

- PSHx:                                                                                           

23:06 None;                                                                                   tl1 

                                                                                                  

- Immunization history:: Adult Immunizations unknown.                                             

- Social history:: Smoking status: Patient/guardian denies using tobacco, never smoked,           

  Patient/guardian denies using alcohol, street drugs.                                            

- Ebola Screening: : Patient negative for fever greater than or equal to 101.5 degrees            

  Fahrenheit, and additional compatible Ebola Virus Disease symptoms Patient denies               

  exposure to infectious person Patient denies travel to an Ebola-affected area in the            

  21 days before illness onset.                                                                   

                                                                                                  

                                                                                                  

ROS:                                                                                              

                                                                                             

00:23 Constitutional: Negative for fever, chills, and weight loss, Eyes: Negative for injury, snw 

      pain, redness, and discharge.                                                               

      Neck: Negative for injury, pain, and swelling, Cardiovascular: Negative for chest pain,     

      palpitations, and edema, Abdomen/GI: Negative for abdominal pain, nausea, vomiting,         

      diarrhea, and constipation, Back: Negative for injury and pain, : Negative for            

      injury, bleeding, discharge, and swelling, MS/Extremity: Negative for injury and            

      deformity, Skin: Negative for injury, rash, and discoloration, Neuro: Negative for          

      headache, weakness, numbness, tingling, and seizure.                                        

      ENT: Positive for sore throat.                                                              

      Respiratory: Positive for cough.                                                            

                                                                                                  

Exam:                                                                                             

                                                                                             

23:49 Constitutional:  This is a well developed, well nourished patient who is awake, alert,  snw 

      and in no acute distress. Head/Face:  Normocephalic, atraumatic. Eyes:  Pupils equal        

      round and reactive to light, extra-ocular motions intact.  Lids and lashes normal.          

      Conjunctiva and sclera are non-icteric and not injected.  Cornea within normal limits.      

      Periorbital areas with no swelling, redness, or edema.                                      

      Neck:  Trachea midline, no thyromegaly or masses palpated, and no cervical                  

      lymphadenopathy.  Supple, full range of motion without nuchal rigidity, or vertebral        

      point tenderness.  No Meningismus. Chest/axilla:  Normal chest wall appearance and          

      motion.  Nontender with no deformity.  No lesions are appreciated. Cardiovascular:          

      Regular rate and rhythm with a normal S1 and S2.  No gallops, murmurs, or rubs.  Normal     

      PMI, no JVD.  No pulse deficits. Abdomen/GI:  Soft, non-tender, with normal bowel           

      sounds.  No distension or tympany.  No guarding or rebound.  No evidence of tenderness      

      throughout. Back:  No spinal tenderness.  No costovertebral tenderness.  Full range of      

      motion.                                                                                     

      Skin:  Warm, dry with normal turgor.  Normal color with no rashes, no lesions, and no       

      evidence of cellulitis. MS/ Extremity:  Pulses equal, no cyanosis.  Neurovascular           

      intact.  Full, normal range of motion. Neuro:  Awake and alert, GCS 15, oriented to         

      person, place, time, and situation.  Cranial nerves II-XII grossly intact.  Motor           

      strength 5/5 in all extremities.  Sensory grossly intact.  Cerebellar exam normal.          

      Normal gait. Psych:  Awake, alert, with orientation to person, place and time.              

      Behavior, mood, and affect are within normal limits.                                        

      ENT: Ear canal(s): are normal, TM's: are normal, Nose: is normal, Posterior pharynx:        

      erythema, that is mild.                                                                     

      Respiratory: the patient does not display signs of respiratory distress,  Respirations:     

      shallow respirations, that is mild, Breath sounds: are clear throughout, no rales,          

      rhonchi, no stridor, no wheezing.                                                           

                                                                                                  

Vital Signs:                                                                                      

23:06  / 79; Pulse 94; Resp 17; Temp 98.1; Pulse Ox 99% ; Weight 136.08 kg; Height 5    tl1 

      ft. 5 in. (165.10 cm); Pain 8/10;                                                           

                                                                                             

01:27  / 83; Pulse 81; Resp 16; Pulse Ox 99% on R/A;                                    jb4 

                                                                                             

23:06 Body Mass Index 49.92 (136.08 kg, 165.10 cm)                                            tl1 

                                                                                                  

MDM:                                                                                              

                                                                                             

23:21 Patient medically screened.                                                             ani 

                                                                                             

00:21 Data reviewed: vital signs, nurses notes. Data interpreted: Pulse oximetry: on room air snw 

      is 99 %. Interpretation: normal. Counseling: I had a detailed discussion with the           

      patient and/or guardian regarding: the historical points, exam findings, and any            

      diagnostic results supporting the discharge/admit diagnosis, lab results, the need for      

      outpatient follow up, to return to the emergency department if symptoms worsen or           

      persist or if there are any questions or concerns that arise at home. Special               

      discussion: Based on the history and exam findings, there is no indication for further      

      emergent testing or inpatient evaluation. I discussed with the patient/guardian the         

      need to see the primary care provider for further evaluation of the symptoms.               

                                                                                                  

                                                                                             

22:19 Order name: Flu; Complete Time: 00:20                                                   snw 

                                                                                             

22:19 Order name: Strep; Complete Time: 00:02                                                 snw 

                                                                                             

00:00 Order name: Throat Culture                                                              EDMS

                                                                                                  

Administered Medications:                                                                         

: Drug: predniSONE 20 mg Route: PO;                                                        Follow up: Response: Medication administered at discharge.                               Drug: Pepcid 20 mg Route: PO;                                                            Follow up: Response: Medication administered at discharge.                              [facility] 

                                                                                                  

                                                                                                  

Disposition:                                                                                      

07:54 Co-signature as Attending Physician, Alexander Soliman MD I agree with the assessment and  [facility] 

      plan of care.                                                                               

                                                                                                  

Disposition:                                                                                      

19 00:20 Discharged to Home. Impression: Acute bronchitis, unspecified.                     

- Condition is Stable.                                                                            

- Discharge Instructions: Acute Bronchitis, Adult, Sore Throat.                                   

- Prescriptions for Tessalon Perles 100 mg Oral Capsule - take 1 capsule by ORAL route            

  every 8 hours As needed; 15 capsule. Prednisone 20 mg Oral Tablet - take 2 tablet by            

  ORAL route once daily for 5 days; 10 tablet.                                                    

- Work release form, Medication Reconciliation Form, Thank You Letter, Antibiotic                 

  Education, Prescription Opioid Use form.                                                        

- Follow up: Private Physician; When: 2 - 3 days; Reason: Recheck today's complaints,             

  Continuance of care, Re-evaluation by your physician. Follow up: Emergency                      

  Department; When: As needed; Reason: Worsening of condition.                                    

                                                                                                  

                                                                                                  

                                                                                                  

Signatures:                                                                                       

Dispatcher [employer]                           EDMS                                                 

Alexander Soliman, Trish Lopez MD, cha, FNP-C                 FNP-Csnw                                                  

Eliza Greenwood, RN                      RN   tl1                                                  

Linus Bustamante RN                       RN   jb4                                                  

                                                                                                  

Corrections: (The following items were deleted from the chart)                                    

01:28 00:20 2019 00:20 Discharged to Home. Impression: Acute bronchitis, unspecified.   jb4 

      Condition is Stable. Forms are Medication Reconciliation Form, Thank You Letter,            

      Antibiotic Education, Prescription Opioid Use. Follow up: Private Physician; When: 2 -      

      3 days; Reason: Recheck today's complaints, Continuance of care, Re-evaluation by your      

      physician. Follow up: Emergency Department; When: As needed; Reason: Worsening of           

      condition. snw                                                                              

                                                                                                  

**************************************************************************************************

## 2019-12-26 NOTE — ER
Nurse's Notes                                                                                     

 [facility]                                                                 

Name: Nora Elizondo                                                                          

Age: 23 yrs                                                                                       

Sex: Female                                                                                       

: 1995                                                                                   

MRN: [id number]                                                                                   

Arrival Date: 2019                                                                          

Time: 22:16                                                                                       

Account#: [id number]                                                                            

Bed 5                                                                                             

Private MD:                                                                                       

Diagnosis: Acute bronchitis, unspecified                                                          

                                                                                                  

Presentation:                                                                                     

                                                                                             

23:03 Presenting complaint: Patient states: Sore throat and painful cough for approx 2 days.  tl1 

      Transition of care: patient was not received from another setting of care. Onset of         

      symptoms was 2019. Risk Assessment: Do you want to hurt yourself or            

      someone else? Patient reports no desire to harm self or others. Initial Sepsis Screen:      

      Does the patient meet any 2 criteria? No. Patient's initial sepsis screen is negative.      

      Does the patient have a suspected source of infection? No. Patient's initial sepsis         

      screen is negative. Care prior to arrival: None.                                            

23:03 Method Of Arrival: Ambulatory                                                           tl1 

23:03 Acuity: FRANK 4                                                                           tl1 

                                                                                                  

OB/GYN:                                                                                           

23:06 LMP N/A - Depo-provera                                                                  tl1 

                                                                                                  

Historical:                                                                                       

- Allergies:                                                                                      

23:06 No Known Allergies;                                                                     tl1 

- Home Meds:                                                                                      

23:06 None [Active];                                                                          tl1 

- PMHx:                                                                                           

23:06 None;                                                                                   tl1 

- PSHx:                                                                                           

23:06 None;                                                                                   tl1 

                                                                                                  

- Immunization history:: Adult Immunizations unknown.                                             

- Social history:: Smoking status: Patient/guardian denies using tobacco, never smoked,           

  Patient/guardian denies using alcohol, street drugs.                                            

- Ebola Screening: : Patient negative for fever greater than or equal to 101.5 degrees            

  Fahrenheit, and additional compatible Ebola Virus Disease symptoms Patient denies               

  exposure to infectious person Patient denies travel to an Ebola-affected area in the            

  21 days before illness onset.                                                                   

                                                                                                  

                                                                                                  

Screenin:38 Abuse screen: Denies threats or abuse. Nutritional screening: On. Tuberculosis          jb4 

      screening: No symptoms or risk factors identified. Fall Risk None identified.               

                                                                                                  

Assessment:                                                                                       

23:38 General: Appears in no apparent distress. comfortable, Behavior is calm, cooperative,   jb4 

      appropriate for age. Pain: Complains of pain in chest, throat Pain does not radiate.        

      Pain currently is 8 out of 10 on a pain scale. Quality of pain is described as. Neuro:      

      Level of Consciousness is awake, alert, obeys commands, Oriented to person, place,          

      time, situation. Cardiovascular: Patient's skin is warm and dry. Respiratory: Airway is     

      patent Respiratory effort is even, unlabored, Respiratory pattern is regular,               

      symmetrical. GI: No signs and/or symptoms were reported involving the gastrointestinal      

      system. : No signs and/or symptoms were reported regarding the genitourinary system.      

      EENT: Throat is clear is reddened. Derm: Skin is intact, Skin is pink, warm \T\ dry.        

      Musculoskeletal: Circulation, motion, and sensation intact. Range of motion:.               

                                                                                             

01:27 Reassessment: Patient appears in no apparent distress at this time. Patient and/or      jb4 

      family updated on plan of care and expected duration. Pain level reassessed. Patient is     

      alert, oriented x 3, equal unlabored respirations, skin warm/dry/pink.                      

                                                                                                  

Vital Signs:                                                                                      

                                                                                             

23:06  / 79; Pulse 94; Resp 17; Temp 98.1; Pulse Ox 99% ; Weight 136.08 kg; Height 5    tl1 

      ft. 5 in. (165.10 cm); Pain 8/10;                                                           

                                                                                             

01:27  / 83; Pulse 81; Resp 16; Pulse Ox 99% on R/A;                                    jb4 

                                                                                             

23:06 Body Mass Index 49.92 (136.08 kg, 165.10 cm)                                            tl1 

                                                                                                  

ED Course:                                                                                        

                                                                                             

22:16 Patient arrived in ED.                                                                  as  

22:17 [facility]                                                        snw 

22:17 Alexander Soliman MD is Attending Physician.                                             snw 

23:04 Triage completed.                                                                       tl1 

23:07 Arm band placed on right wrist.                                                         tl1 

23:21 Linus Bustamante, RN is Primary Nurse.                                                     jb4 

23:38 Patient has correct armband on for positive identification. Bed in low position. Call   jb4 

      light in reach. Side rails up X 1. Pulse ox on. NIBP on.                                    

                                                                                             

01:27 No provider procedures requiring assistance completed. Patient did not have IV access   jb4 

      during this emergency room visit.                                                           

                                                                                                  

Administered Medications:                                                                         

: Drug: predniSONE 20 mg Route: PO;                                                       jb4 

: Follow up: Response: Medication administered at discharge.                              jb4 

: Drug: Pepcid 20 mg Route: PO;                                                           jb4 

:26 Follow up: Response: Medication administered at discharge.                              jb4 

                                                                                                  

                                                                                                  

Outcome:                                                                                          

00:20 Discharge ordered by MD.                                                                misti 

01:27 Discharged to home ambulatory, with family.                                             jb4 

01:27 Condition: stable                                                                           

01:27 Discharge instructions given to patient, family, Instructed on discharge instructions,      

      follow up and referral plans. medication usage, Demonstrated understanding of               

      instructions, follow-up care, medications, Prescriptions given X 2.                         

01:28 Patient left the ED.                                                                    jb4 

                                                                                                  

Signatures:                                                                                       

Trish Cook, FNP-C                 FNP-Melinda Turner Tonya, RN                      RN   tl1                                                  

Linus Bustamante, RN                       RN   jb4                                                  

                                                                                                  

**************************************************************************************************

## 2020-03-29 ENCOUNTER — HOSPITAL ENCOUNTER (EMERGENCY)
Dept: HOSPITAL 97 - ER | Age: 25
Discharge: HOME | End: 2020-03-29
Payer: COMMERCIAL

## 2020-03-29 VITALS — TEMPERATURE: 98.7 F | OXYGEN SATURATION: 100 % | SYSTOLIC BLOOD PRESSURE: 105 MMHG | DIASTOLIC BLOOD PRESSURE: 58 MMHG

## 2020-03-29 DIAGNOSIS — R07.9: Primary | ICD-10-CM

## 2020-03-29 LAB
ALBUMIN SERPL BCP-MCNC: 3.6 G/DL (ref 3.4–5)
ALP SERPL-CCNC: 80 U/L (ref 45–117)
ALT SERPL W P-5'-P-CCNC: 22 U/L (ref 12–78)
AST SERPL W P-5'-P-CCNC: 18 U/L (ref 15–37)
BUN BLD-MCNC: 11 MG/DL (ref 7–18)
GLUCOSE SERPLBLD-MCNC: 80 MG/DL (ref 74–106)
HCT VFR BLD CALC: 38.4 % (ref 36–45)
INR BLD: 1.22
LYMPHOCYTES # SPEC AUTO: 1.8 K/UL (ref 0.7–4.9)
MAGNESIUM SERPL-MCNC: 2.2 MG/DL (ref 1.8–2.4)
NT-PROBNP SERPL-MCNC: 73 PG/ML (ref ?–125)
PMV BLD: 8.7 FL (ref 7.6–11.3)
POTASSIUM SERPL-SCNC: 3.7 MMOL/L (ref 3.5–5.1)
RBC # BLD: 4.68 M/UL (ref 3.86–4.86)
TROPONIN (EMERG DEPT USE ONLY): < 0.02 NG/ML (ref 0–0.04)

## 2020-03-29 PROCEDURE — 71045 X-RAY EXAM CHEST 1 VIEW: CPT

## 2020-03-29 PROCEDURE — 85025 COMPLETE CBC W/AUTO DIFF WBC: CPT

## 2020-03-29 PROCEDURE — 80076 HEPATIC FUNCTION PANEL: CPT

## 2020-03-29 PROCEDURE — 99284 EMERGENCY DEPT VISIT MOD MDM: CPT

## 2020-03-29 PROCEDURE — 85610 PROTHROMBIN TIME: CPT

## 2020-03-29 PROCEDURE — 87070 CULTURE OTHR SPECIMN AEROBIC: CPT

## 2020-03-29 PROCEDURE — 83880 ASSAY OF NATRIURETIC PEPTIDE: CPT

## 2020-03-29 PROCEDURE — 80048 BASIC METABOLIC PNL TOTAL CA: CPT

## 2020-03-29 PROCEDURE — 93005 ELECTROCARDIOGRAM TRACING: CPT

## 2020-03-29 PROCEDURE — 83735 ASSAY OF MAGNESIUM: CPT

## 2020-03-29 PROCEDURE — 84484 ASSAY OF TROPONIN QUANT: CPT

## 2020-03-29 PROCEDURE — 87081 CULTURE SCREEN ONLY: CPT

## 2020-03-29 PROCEDURE — 87804 INFLUENZA ASSAY W/OPTIC: CPT

## 2020-03-29 PROCEDURE — 36415 COLL VENOUS BLD VENIPUNCTURE: CPT

## 2020-03-29 NOTE — RAD REPORT
EXAM DESCRIPTION:  Branden Single View3/29/2020 7:34 am

 

CLINICAL HISTORY:  Chest pain

 

COMPARISON:  2018

 

FINDINGS:   The lungs appear clear of acute infiltrate. The heart is normal size

 

IMPRESSION:   No acute abnormalities displayed

## 2020-03-29 NOTE — XMS REPORT
Patient Summary Document

 Created on:2020



Patient:OLLIE FELIPE

Sex:Female

:1995

External Reference #:[id number]





Demographics







 Address  [address] [place] [address]

 

 Home Phone  [phone]

 

 Work Phone  [phone]

 

 Email Address  NONE

 

 Preferred Language  Unknown

 

 Marital Status  Unknown

 

 Jainism Affiliation  Unknown

 

 Race  Unknown

 

 Additional Race(s)  Unavailable

 

 Ethnic Group  Unknown









Author







 [facility]

 

 Address  [address] [place] [address] [address]



   [place] [address]

 

 Phone  [phone]









Care Team Providers







 Name  Role  Phone

 

 Unavailable  Unavailable  Unavailable









Problems

This patient has no known problems.



Allergies, Adverse Reactions, Alerts

This patient has no known allergies or adverse reactions.



Medications

This patient has no known medications.

## 2020-03-29 NOTE — ER
Nurse's Notes                                                                                     

 [facility]                                                                 

Name: Nora Elizondo                                                                          

Age: 24 yrs                                                                                       

Sex: Female                                                                                       

: 1995                                                                                   

MRN: [id number]                                                                                   

Arrival Date: 2020                                                                          

Time: 06:47                                                                                       

Account#: [id number]                                                                            

Bed 20                                                                                            

Private MD:                                                                                       

Diagnosis: Chest pain, unspecified                                                                

                                                                                                  

Presentation:                                                                                     

                                                                                             

06:54 Chief complaint: Patient states: I have fever max of T 100.4 F started today morning    rr5 

      and chest pain feels like there is heavy cement on my chest started yesterday. the pain     

      is at the center, continuous non radiating. denies cough, denies SOB. Coronavirus           

      screen: Patient denies fever greater than 100.4F, cough, shortness of breath, or            

      difficulty breathing. Proceed with normal triage process. Ebola Screen: No symptoms or      

      risks identified at this time. Initial Sepsis Screen: Does the patient meet any 2           

      criteria? No. Patient's initial sepsis screen is negative. Does the patient have a          

      suspected source of infection? No. Patient's initial sepsis screen is negative. Risk        

      Assessment: Do you want to hurt yourself or someone else? Patient reports no desire to      

      harm self or others. Onset of symptoms was 2020.                                  

06:54 Method Of Arrival: Ambulatory                                                           rr5 

06:54 Acuity: FRANK 3                                                                           rr5 

                                                                                                  

Historical:                                                                                       

- Allergies:                                                                                      

07:00 No Known Allergies;                                                                     rr5 

- Home Meds:                                                                                      

07:00 None [Active];                                                                          rr5 

- PMHx:                                                                                           

07:00 None;                                                                                   rr5 

- PSHx:                                                                                           

07:00 None;                                                                                   rr5 

                                                                                                  

- Immunization history:: Adult Immunizations up to date.                                          

- Social history:: Smoking status: unknown Patient/guardian denies using alcohol,                 

  street drugs.                                                                                   

                                                                                                  

                                                                                                  

Screenin:04 Abuse screen: Denies threats or abuse. Denies injuries from another. Nutritional        mg2 

      screening: No deficits noted. Tuberculosis screening: No symptoms or risk factors           

      identified. Fall Risk None identified.                                                      

                                                                                                  

Assessment:                                                                                       

07:02 General: Appears in no apparent distress. comfortable, Behavior is calm, cooperative.   mg2 

      Pain: Complains of pain in chest Pain does not radiate. Pain currently is 3 out of 10       

      on a pain scale. Quality of pain is described as heavy, Pain began gradually, 1 day         

      ago. Is continuous. Neuro: Level of Consciousness is awake, alert, obeys commands,          

      Oriented to person, place, time, situation. Cardiovascular: Capillary refill < 3            

      seconds Patient's skin is warm and dry. Respiratory: Airway is patent Respiratory           

      effort is even, unlabored, Respiratory pattern is regular, symmetrical. GI: No signs        

      and/or symptoms were reported involving the gastrointestinal system. : No signs           

      and/or symptoms were reported regarding the genitourinary system. EENT: No signs and/or     

      symptoms were reported regarding the EENT system. Derm: Skin is intact, is healthy with     

      good turgor, Skin is pink, warm \T\ dry. normal. Musculoskeletal: Circulation, motion,      

      and sensation intact. Capillary refill < 3 seconds.                                         

08:00 Reassessment: Patient appears in no apparent distress at this time. Patient and/or      hb  

      family updated on plan of care and expected duration. Pain level reassessed. Patient is     

      alert, oriented x 3, equal unlabored respirations, skin warm/dry/pink.                      

                                                                                                  

Vital Signs:                                                                                      

06:54  / 58; Pulse 105; Resp 20; Temp 98.9; Pulse Ox 98% ; Weight 140.61 kg; Height 5   rr5 

      ft. 5 in. (165.10 cm); Pain 8/10;                                                           

08:15  / 58; Pulse 88; Resp 16; Temp 98.7; Pulse Ox 100% ; Pain 2/10;                   hb  

06:54 Body Mass Index 51.59 (140.61 kg, 165.10 cm)                                            rr5 

                                                                                                  

ED Course:                                                                                        

06:47 Patient arrived in ED.                                                                  ag3 

06:53 Kee Hughes NP is PHCP.                                                           pm1 

06:53 Manny Russo MD is Attending Physician.                                                pm1 

06:59 Triage completed.                                                                       rr5 

07:01 Arm band placed on right wrist. EKG completed in triage. Results shown to MD.           rr5 

07:03 Patient has correct armband on for positive identification. Bed in low position. Call   rr5 

      light in reach. Cardiac monitor on. Pulse ox on. NIBP on.                                   

07:09 Lani Breaux, RN is Primary Nurse.                                                   hb  

07:34 XRAY Chest (1 view) In Process Unspecified.                                             EDMS

08:00 Patient maintains SpO2 saturation greater than 95% on room air.                         hb  

08:44 No provider procedures requiring assistance completed. IV discontinued, intact,         hb  

      bleeding controlled, No redness/swelling at site. Pressure dressing applied.                

                                                                                                  

Administered Medications:                                                                         

No medications were administered                                                                  

                                                                                                  

                                                                                                  

Outcome:                                                                                          

08:29 Discharge ordered by MD.                                                                pm1 

08:44 Discharged to home ambulatory.                                                            

08:44 Condition: stable                                                                           

08:44 Discharge instructions given to patient, Instructed on discharge instructions, follow       

      up and referral plans. medication usage, Demonstrated understanding of instructions,        

      follow-up care, medications.                                                                

08:45 Patient left the ED.                                                                      

                                                                                                  

Signatures:                                                                                       

Dispatcher [employer]                           EDMS                                                 

Kee Hughes NP                    NP   pm1                                                  

Lani Breaux RN                     RN                                                      

Jack Lindsay RN                    RN   [facility]                                                  

Zehra Bangura                                 3                                                  

Demetrio Walter RN                      RN   rr5                                                  

                                                                                                  

**************************************************************************************************

## 2020-03-29 NOTE — EKG
Test Date:    2020-03-29               Test Time:    06:58:49

Technician:   MG                                     

                                                     

MEASUREMENT RESULTS:                                       

Intervals:                                           

Rate:         89                                     

TX:           162                                    

QRSD:         82                                     

QT:           364                                    

QTc:          442                                    

Axis:                                                

P:            46                                     

TX:           162                                    

QRS:          45                                     

T:            54                                     

                                                     

INTERPRETIVE STATEMENTS:                                       

                                                     

Normal sinus rhythm

Normal ECG

Compared to ECG 07/31/2018 01:22:52

No significant changes



Electronically Signed On 03-29-20 09:15:36 CDT by Juan Candelaria

## 2020-03-29 NOTE — EDPHYS
Physician Documentation                                                                           

 [facility]                                                                 

Name: Nora Elizondo                                                                          

Age: 24 yrs                                                                                       

Sex: Female                                                                                       

: 1995                                                                                   

MRN: [id number]                                                                                   

Arrival Date: 2020                                                                          

Time: 06:47                                                                                       

Account#: [id number]                                                                            

Bed 20                                                                                            

Private MD:                                                                                       

ED Physician Manny Russo                                                                         

HPI:                                                                                              

                                                                                             

07:35 This 24 yrs old  Female presents to ER via Ambulatory with complaints of       pm1 

      Fever, Chest Pain.                                                                          

07:35 The patient or guardian reports chest pain that is located primarily in the mid-sternal pm1 

      area. The pain does not radiate. Associated signs and symptoms: Pertinent positives:        

      Fever, Pertinent negatives: abdominal pain, cough, diaphoresis, dizziness, headache,        

      nausea, shortness of breath, vomiting, Chills, Sore throat, Diarrhea. The chest pain is     

      described as a pressure. Duration: The patient or guardian reports a single episode,        

      that is still ongoing. Modifying factors: the symptoms are aggravated by deep breath,       

      palpation of area. Severity of pain: in the emergency department the pain is unchanged.     

      The patient has not experienced similar symptoms in the past. It is unknown whether or      

      not the patient has recently seen a physician.                                              

                                                                                                  

Historical:                                                                                       

- Allergies:                                                                                      

07:00 No Known Allergies;                                                                     rr5 

- Home Meds:                                                                                      

07:00 None [Active];                                                                          rr5 

- PMHx:                                                                                           

07:00 None;                                                                                   rr5 

- PSHx:                                                                                           

07:00 None;                                                                                   rr5 

                                                                                                  

- Immunization history:: Adult Immunizations up to date.                                          

- Social history:: Smoking status: unknown Patient/guardian denies using alcohol,                 

  street drugs.                                                                                   

                                                                                                  

                                                                                                  

ROS:                                                                                              

07:35 Neck: Negative for injury, pain, and swelling.                                          pm1 

07:35 Respiratory: Negative for shortness of breath, cough, wheezing                              

07:35 Abdomen/GI: Negative for abdominal pain, nausea, vomiting, diarrhea, and constipation,      

      Back: Negative for injury and pain, MS/Extremity: Negative for injury and deformity,        

      Skin: Negative for injury, rash, and discoloration.                                         

07:35 Neuro: Negative for headache, weakness, numbness, tingling, and seizure.                    

07:35 Constitutional: Positive for fever, Negative for poor PO intake.                            

07:35 Cardiovascular: Positive for chest pain, Negative for edema, orthopnea, palpitations.       

                                                                                                  

Exam:                                                                                             

07:35 Constitutional:  This is a well developed, well nourished patient who is awake, alert,  pm1 

      and in no acute distress. Head/Face:  Normocephalic, atraumatic. Neck:  Trachea             

      midline, no thyromegaly or masses palpated, and no cervical lymphadenopathy.  Supple,       

      full range of motion without nuchal rigidity, or vertebral point tenderness.  No            

      Meningismus.                                                                                

07:35 Cardiovascular:  Regular rate and rhythm with a normal S1 and S2.  No gallops, murmurs,     

      or rubs.  Normal PMI, no JVD.  No pulse deficits. Respiratory:  Lungs have equal breath     

      sounds bilaterally, clear to auscultation and percussion.  No rales, rhonchi or wheezes     

      noted.  No increased work of breathing, no retractions or nasal flaring. Abdomen/GI:        

      Soft, non-tender, with normal bowel sounds.  No distension or tympany.  No guarding or      

      rebound.  No evidence of tenderness throughout. Back:  No spinal tenderness.  No            

      costovertebral tenderness.  Full range of motion. Skin:  Warm, dry with normal turgor.      

      Normal color with no rashes, no lesions, and no evidence of cellulitis. MS/ Extremity:      

      Pulses equal, no cyanosis.  Neurovascular intact.  Full, normal range of motion.            

07:35 Chest/axilla: Inspection: normal, Palpation: tenderness, of the  mid-sternal area, that     

      totally reproduces the patient's complaints, Deep breathing elicits patients chest          

      pain.                                                                                       

07:35 Neuro: Orientation: is normal, Mentation: is normal, Motor: is normal, moves all fours.     

                                                                                                  

Vital Signs:                                                                                      

06:54  / 58; Pulse 105; Resp 20; Temp 98.9; Pulse Ox 98% ; Weight 140.61 kg; Height 5   rr5 

      ft. 5 in. (165.10 cm); Pain 8/10;                                                           

08:15  / 58; Pulse 88; Resp 16; Temp 98.7; Pulse Ox 100% ; Pain 2/10;                   hb  

06:54 Body Mass Index 51.59 (140.61 kg, 165.10 cm)                                            rr5 

                                                                                                  

MDM:                                                                                              

07:06 Patient medically screened.                                                             pm1 

07:15 Data reviewed: vital signs. Data interpreted: Pulse oximetry: on room air is 98 %.      pm1 

      Interpretation: normal.                                                                     

08:25 Differential diagnosis: acute myocardial infarction, acute pericarditis, anxiety, chest pm1 

      wall pain, pneumonia, pulmonary embolus, influenza, URI.                                    

08:28 Counseling: I had a detailed discussion with the patient and/or guardian regarding: the pm1 

      historical points, exam findings, and any diagnostic results supporting the                 

      discharge/admit diagnosis, lab results, radiology results, the need for outpatient          

      follow up, to return to the emergency department if symptoms worsen or persist or if        

      there are any questions or concerns that arise at home.                                     

                                                                                                  

                                                                                             

07:04 Order name: Basic Metabolic Panel; Complete Time: 08:13                                 pm                                                                                             

07:04 Order name: CBC with Diff; Complete Time: 07:54                                         pm                                                                                             

07:04 Order name: LFT's; Complete Time: 08:13                                                 pm1 

                                                                                             

07:04 Order name: Magnesium; Complete Time: 08:13                                             pm                                                                                             

07:04 Order name: NT PRO-BNP; Complete Time: 08:13                                            pm                                                                                             

07:04 Order name: PT-INR; Complete Time: 08:13                                                pm                                                                                             

07:01 Order name: EKG - Nurse/Tech; Complete Time: 07:02                                      mg2 

                                                                                             

07:04 Order name: Troponin (emerg Dept Use Only); Complete Time: 08:13                        pm1 

                                                                                             

07:04 Order name: XRAY Chest (1 view)                                                         pm                                                                                             

07:04 Order name: EKG; Complete Time: 07:05                                                   pm                                                                                             

07:04 Order name: Cardiac monitoring; Complete Time: 07:41                                    pm                                                                                             

07:04 Order name: Flu; Complete Time: 08:19                                                   pm                                                                                             

07:04 Order name: Strep; Complete Time: 08:19                                                 pm                                                                                             

08:19 Order name: Throat Culture                                                              [facility]

                                                                                             

07:04 Order name: IV Saline Lock; Complete Time: 07:42                                        pm                                                                                             

07:04 Order name: Labs collected and sent; Complete Time: 07:42                               pm                                                                                             

07:04 Order name: O2 Per Protocol; Complete Time: 07:43                                       pm                                                                                             

07:04 Order name: O2 Sat Monitoring; Complete Time: 07:43                                     pm                                                                                             

07:12 Order name: Urine Dipstick-Ancillary (obtain specimen); Complete Time: 08:36            pm                                                                                             

07:12 Order name: Urine Pregnancy Test (obtain specimen); Complete Time: 08:36                pm1 

                                                                                                  

Administered Medications:                                                                         

No medications were administered                                                                  

                                                                                                  

                                                                                                  

Disposition:                                                                                      

19:03 Co-signature as Attending Physician, Manny Russo MD.                                    rn  

                                                                                                  

Disposition:                                                                                      

20 08:29 Discharged to Home. Impression: Chest pain, unspecified.                           

- Condition is Stable.                                                                            

- Discharge Instructions: Nonspecific Chest Pain.                                                 

                                                                                                  

- Work release form, Medication Reconciliation Form, Thank You Letter, Antibiotic                 

  Education, Prescription Opioid Use form.                                                        

- Follow up: Emergency Department; When: As needed; Reason: Worsening of condition.               

  Follow up: Private Physician; When: 2 - 3 days; Reason: Recheck today's complaints,             

  Continuance of care, Re-evaluation by your physician.                                           

- Problem is new.                                                                                 

- Symptoms have improved.                                                                         

                                                                                                  

                                                                                                  

                                                                                                  

Signatures:                                                                                       

Dispatcher [employer]                           EDMS                                                 

Manny Russo MD MD   rn                                                   

Kee Hughes, BENNY                    NP   pm1                                                  

Lani Breaux RN                     RN                                                      

Jack Lindsay RN                    RN   [facility]                                                  

Demetrio Walter RN                      RN   rr5                                                  

                                                                                                  

Corrections: (The following items were deleted from the chart)                                    

08:45 08:29 2020 08:29 Discharged to Home. Impression: Chest pain, unspecified.         hb  

      Condition is Stable. Forms are Medication Reconciliation Form, Thank You Letter,            

      Antibiotic Education, Prescription Opioid Use. Follow up: Emergency Department; When:       

      As needed; Reason: Worsening of condition. Follow up: Private Physician; When: 2 - 3        

      days; Reason: Recheck today's complaints, Continuance of care, Re-evaluation by your        

      physician. Problem is new. Symptoms have improved. pm1                                      

                                                                                                  

**************************************************************************************************

## 2021-06-19 ENCOUNTER — HOSPITAL ENCOUNTER (EMERGENCY)
Dept: HOSPITAL 97 - ER | Age: 26
LOS: 1 days | Discharge: HOME | End: 2021-06-20
Payer: COMMERCIAL

## 2021-06-19 DIAGNOSIS — Z72.0: ICD-10-CM

## 2021-06-19 DIAGNOSIS — J02.9: Primary | ICD-10-CM

## 2021-06-19 PROCEDURE — 87081 CULTURE SCREEN ONLY: CPT

## 2021-06-19 PROCEDURE — 99283 EMERGENCY DEPT VISIT LOW MDM: CPT

## 2021-06-19 PROCEDURE — 87070 CULTURE OTHR SPECIMN AEROBIC: CPT

## 2021-06-19 PROCEDURE — 96372 THER/PROPH/DIAG INJ SC/IM: CPT

## 2021-06-19 NOTE — XMS REPORT
Continuity of Care Document

                            Created on:2021



Patient:OLLIE FELIPE

Sex:Female

:1995

External Reference #:[id number]





Demographics







                          Address                   [address] [place] [address]

 

                          Home Phone                [phone]

 

                          Work Phone                [phone]

 

                          Mobile Phone              [phone]

 

                          Email Address             NONE

 

                          Preferred Language        en

 

                          Marital Status            Unknown

 

                          Mandaeism Affiliation     Unknown

 

                          Race                      Unknown

 

                          Additional Race(s)        Unavailable



                                                    White

 

                          Ethnic Group              Not  or 









Author







                          Organization              [facility]

t

 

                          Address                   [address]



                                                    [place] [address]

 

                          Phone                     [phone]









Care Team Providers







                    Name                Role                Phone

 

                    NORM Szymanski      Attending Clinician [phone]









Problems

This patient has no known problems.



Allergies, Adverse Reactions, Alerts

This patient has no known allergies or adverse reactions.



Medications

This patient has no known medications.



Procedures

This patient has no known procedures.



Encounters







        Start   End     Encounter Admission Attending Care    Care    Encounter 

Source



        Date/Time Date/Time Type    Type    Clinicians Facility Department ID   

   

 

        2021 Telephone         Tez BANDAR    [phone]

[id number] 



        00:00:00 00:00:00                 [facility]  [id number]        

 



                                                REGIONAL [id number]         



                                                MATERNAL [phone]         



                                                & CHILD [address]             



                                                [facility]                 

 

        2019 Office          BANDAR Reagan    [phone]

12 



        08:05:47 08:44:23 Visit           [facility]  [id number]        

 



                                                REGIONAL [id number]         



                                                MATERNAL [phone]         



                                                & CHILD 107             



                                                [facility]                 







Results

This patient has no known results.

## 2021-06-20 VITALS — SYSTOLIC BLOOD PRESSURE: 101 MMHG | DIASTOLIC BLOOD PRESSURE: 76 MMHG

## 2021-06-20 VITALS — OXYGEN SATURATION: 99 % | TEMPERATURE: 98.7 F

## 2021-06-20 NOTE — EDPHYS
Physician Documentation                                                                           

 [facility]                                                                 

Name: Nora Elizondo                                                                          

Age: 25 yrs                                                                                       

Sex: Female                                                                                       

: 1995                                                                                   

MRN: [id number]                                                                                   

Arrival Date: 2021                                                                          

Time: 23:50                                                                                       

Account#: [id number]                                                                            

Bed 20                                                                                            

Private MD:                                                                                       

ED Physician Jonas Viera                                                                      

HPI:                                                                                              

                                                                                             

00:32 This 25 yrs old  Female presents to ER via Ambulatory with complaints of Sore  jmm 

      Throat.                                                                                     

00:32 The patient presents with sore throat.                                                  jmm 

00:32 Onset: The symptoms/episode began/occurred gradually, 1 day(s) ago. Modifying factors:  jmm 

      The symptoms are alleviated by nothing, the symptoms are aggravated by nothing.             

      Associated signs and symptoms: Pertinent positives: cough. The patient has experienced      

      similar episodes in the past.                                                               

                                                                                                  

OB/GYN:                                                                                           

00:03 LMP N/A - Birth control method                                                          rr5 

                                                                                                  

Historical:                                                                                       

- Allergies:                                                                                      

00:02 No Known Allergies;                                                                     rr5 

- Home Meds:                                                                                      

00:02 None [Active];                                                                          rr5 

- PMHx:                                                                                           

00:02 None;                                                                                   rr5 

- PSHx:                                                                                           

00:02 None;                                                                                   rr5 

                                                                                                  

- Immunization history:: Adult Immunizations up to date, Client reports receiving the             

  2nd dose of the Covid vaccine, Date received: 2021.                                       

- Social history:: Smoking status: Patient reports the use of cigarette tobacco                   

  products, Patient/guardian denies using alcohol, street drugs.                                  

                                                                                                  

                                                                                                  

ROS:                                                                                              

00:32 Constitutional: Negative for fever, chills, and weight loss.                            jmm 

00:32 ENT: Positive for sore throat.                                                              

00:32 All other systems are negative.                                                             

                                                                                                  

Exam:                                                                                             

00:32 Constitutional:  This is a well developed, well nourished patient who is awake, alert,  jmm 

      and in no acute distress. Head/Face:  atraumatic. Eyes:  EOMI, no conjunctival erythema     

      appreciated                                                                                 

00:32 Neck:  Trachea midline, Supple Chest/axilla:  Normal chest wall appearance and motion.      

       Cardiovascular:  Regular rate and rhythm.  No edema appreciated Respiratory:  Normal       

      respirations, no respiratory distress appreciated Abdomen/GI:  Non distended, soft          

      Back:  Normal ROM Skin:  General appearance color normal MS/ Extremity:  Moves all          

      extremities, no obvious deformities appreciated, no edema noted to the lower                

      extremities  Neuro:  Awake and alert, normal gait Psych:  Behavior is normal, Mood is       

      normal, Patient is cooperative and pleasant                                                 

00:32 ENT: Posterior pharynx: erythema, that is mild.                                             

                                                                                                  

Vital Signs:                                                                                      

                                                                                             

23:57  / 99; Pulse 91; Resp 19; Temp 98.7; Pulse Ox 99% ; Weight 145.15 kg; Height 5    rr5 

      ft. 5 in. (165.10 cm); Pain 7/10;                                                           

                                                                                             

00:53  / 76; Pulse 75; Resp 17; Pulse Ox 99% ;                                          rr5 

                                                                                             

23:57 Body Mass Index 53.25 (145.15 kg, 165.10 cm)                                            rr5 

                                                                                                  

MDM:                                                                                              

00:16 Patient medically screened.                                                             [facility] 

00:34 Data reviewed: vital signs, nurses notes. Counseling: I had a detailed discussion with  [facility] 

      the patient and/or guardian regarding: the historical points, exam findings, and any        

      diagnostic results supporting the discharge/admit diagnosis, the need for outpatient        

      follow up, to return to the emergency department if symptoms worsen or persist or if        

      there are any questions or concerns that arise at home.                                     

                                                                                                  

                                                                                             

00:12 Order name: Strep                                                                       rr5 

                                                                                                  

Administered Medications:                                                                         

00:20 Drug: Decadron (dexamethasone) 10 mg Route: IM; Site: left deltoid;                     ak2 

00:53 Follow up: Response: No adverse reaction                                                rr5 

                                                                                                  

                                                                                                  

Disposition:                                                                                      

05:14 Co-signature as Attending Physician, Jonas Viera MD.                                 7 

                                                                                                  

Disposition:                                                                                      

21 00:34 Discharged to Home. Impression: Acute pharyngitis.                                 

- Condition is Stable.                                                                            

- Discharge Instructions: Pharyngitis.                                                            

- Prescriptions for Amoxicillin 875 mg Oral Tablet - take 1 tablet by ORAL route every            

  12 hours for 10 days; 20 tablet.                                                                

- Medication Reconciliation Form, Thank You Letter, Antibiotic Education, Prescription            

  Opioid Use, Work release form form.                                                             

- Follow up: Private Physician; When: 2 - 3 days; Reason: Recheck today's complaints,             

  Continuance of care, Re-evaluation by your physician.                                           

                                                                                                  

                                                                                                  

                                                                                                  

Signatures:                                                                                       

Dispatcher [employer]                           EDMS                                                 

Bautista Rosas PA PA   Demetrio Nixon, RN                      RN   rr5                                                  

Jonas Viera MD MD   7                                                  

Pino Michel                             ak2                                                  

                                                                                                  

Corrections: (The following items were deleted from the chart)                                    

00:54 00:34 2021 00:34 Discharged to Home. Impression: Acute pharyngitis. Condition is  rr5 

      Stable. Forms are Medication Reconciliation Form, Thank You Letter, Antibiotic              

      Education, Prescription Opioid Use. Follow up: Private Physician; When: 2 - 3 days;         

      Reason: Recheck today's complaints, Continuance of care, Re-evaluation by your              

      physician. ilene                                                                              

                                                                                                  

**************************************************************************************************

## 2021-06-23 ENCOUNTER — HOSPITAL ENCOUNTER (EMERGENCY)
Dept: HOSPITAL 97 - ER | Age: 26
Discharge: HOME | End: 2021-06-23
Payer: SELF-PAY

## 2021-06-23 VITALS — TEMPERATURE: 98.6 F

## 2021-06-23 VITALS — DIASTOLIC BLOOD PRESSURE: 80 MMHG | SYSTOLIC BLOOD PRESSURE: 128 MMHG

## 2021-06-23 VITALS — OXYGEN SATURATION: 100 %

## 2021-06-23 DIAGNOSIS — R05: Primary | ICD-10-CM

## 2021-06-23 PROCEDURE — 71046 X-RAY EXAM CHEST 2 VIEWS: CPT

## 2021-06-23 PROCEDURE — 96372 THER/PROPH/DIAG INJ SC/IM: CPT

## 2021-06-23 PROCEDURE — 99284 EMERGENCY DEPT VISIT MOD MDM: CPT

## 2021-06-23 NOTE — RAD REPORT
EXAM DESCRIPTION:  Branden Miner (2 Views)6/23/2021 4:42 pm

 

CLINICAL HISTORY:  Cough

 

COMPARISON:  2018

 

FINDINGS:   Small calcified granuloma is unchanged.

 

The lungs appear clear of acute infiltrate. The heart is normal size

 

IMPRESSION:   No acute abnormalities displayed

## 2021-06-23 NOTE — XMS REPORT
Continuity of Care Document

                            Created on:2021



Patient:OLLIE FELIPE

Sex:Female

:1995

External Reference #:[id number]





Demographics







                          Address                   [address] [place] [address]

 

                          Home Phone                [phone]

 

                          Work Phone                [phone]

 

                          Mobile Phone              [phone]

 

                          Email Address             [address]

 

                          Preferred Language        en

 

                          Marital Status            Unknown

 

                          Islam Affiliation     Unknown

 

                          Race                      Unknown

 

                          Additional Race(s)        Unavailable



                                                    White

 

                          Ethnic Group              Not  or 









Author







                          Organization              [facility]

t

 

                          Address                   [address]



                                                    [place] [address]

 

                          Phone                     [phone]









Care Team Providers







                    Name                Role                Phone

 

                    NORM Szymanski      Attending Clinician [phone]









Problems

This patient has no known problems.



Allergies, Adverse Reactions, Alerts

This patient has no known allergies or adverse reactions.



Medications

This patient has no known medications.



Procedures

This patient has no known procedures.



Encounters







        Start   End     Encounter Admission Attending Care    Care    Encounter 

Source



        Date/Time Date/Time Type    Type    Clinicians Facility Department ID   

   

 

        2021 Telephone         [facility]    [phone]

[id number] 



        00:00:00 00:00:00                 [facility]  [id number]        

 



                                                REGIONAL [id number]         



                                                MATERNAL [phone]         



                                                & CHILD [address]             



                                                [facility]                 

 

        2019 Office          [facility]    [phone] [id number]

12 



        08:05:47 08:44:23 Visit           [facility]  [id number]        

 



                                                REGIONAL [id number]         



                                                MATERNAL [phone]         



                                                & CHILD 107             



                                                [facility]                 







Results

This patient has no known results.

## 2021-06-23 NOTE — EDPHYS
Physician Documentation                                                                           

 [facility]                                                                 

Name: Nora Elizondo                                                                          

Age: 25 yrs                                                                                       

Sex: Female                                                                                       

: 1995                                                                                   

MRN: [id number]                                                                                   

Arrival Date: 2021                                                                          

Time: 14:05                                                                                       

Account#: [id number]                                                                            

Bed 30                                                                                            

Private MD:                                                                                       

ED Physician Manny Russo                                                                         

HPI:                                                                                              

                                                                                             

16:02 This 25 yrs old  Female presents to ER via Ambulatory with complaints of Chest rn  

      Pain.                                                                                       

16:02 The patient or guardian reports chest pain that is located primarily in the anterior    rn  

      chest wall, chest diffusely. The pain does not radiate. Associated signs and symptoms:      

      Pertinent positives: cough, Pertinent negatives: near syncope, syncope, vomiting. The       

      chest pain is described as sharp, stabbing. Duration: The patient or guardian reports       

      multiple episodes, that are intermittent. Modifying factors: The symptoms are               

      alleviated by nothing. the symptoms are aggravated by cough, deep breath. Severity of       

      pain: At its worst the pain was mild in the emergency department the pain is unchanged.     

      The patient has not experienced similar symptoms in the past. The patient has been          

      recently seen by a physician:. Reports chest pain, diffuse, assoc with productive           

      cough, for a few days, seen here and tested neg for strep, prescribed steroids and abx.     

      Went to work today because afebrile and told to come here because coughing too much. No     

      chronic lung problems, non-smoker, doesn't vape..                                           

                                                                                                  

OB/GYN:                                                                                           

17:12 LMP 2021                                                                            ld1 

                                                                                                  

Historical:                                                                                       

- Allergies:                                                                                      

14:16 No Known Allergies;                                                                     tw2 

- Home Meds:                                                                                      

14:16 None [Active];                                                                          tw2 

- PMHx:                                                                                           

14:16 None;                                                                                   tw2 

- PSHx:                                                                                           

14:16 None;                                                                                   tw2 

                                                                                                  

- Immunization history:: Adult Immunizations.                                                     

- Social history:: Smoking status: Patient denies any tobacco usage or history of.                

- Family history:: not pertinent.                                                                 

- Hospitalizations: : No recent hospitalization is reported.                                      

                                                                                                  

                                                                                                  

ROS:                                                                                              

16:02 Constitutional: Negative for fever, chills, and weight loss, Eyes: Negative for injury, rn  

      pain, redness, and discharge, Neck: Negative for injury, pain, and swelling,                

      Cardiovascular: Negative for palpitations, and edema, Respiratory: Negative for             

      shortness of breath, wheezing Abdomen/GI: Negative for abdominal pain, nausea,              

      vomiting, diarrhea, and constipation, Back: Negative for injury and pain, MS/Extremity:     

      Negative for injury and deformity, Skin: Negative for injury, rash, and discoloration,      

      Neuro: Negative for headache, weakness, numbness, tingling, and seizure.                    

                                                                                                  

Exam:                                                                                             

16:02 Constitutional:  This is a well developed, well nourished patient who is awake, alert,  rn  

      and in no acute distress. Head/Face:  Normocephalic, atraumatic. Eyes:  Periorbital         

      areas with no swelling, redness, or edema. ENT:  No stridor Chest/axilla:  Normal chest     

      wall appearance and motion.  Nontender with no deformity.  No lesions are appreciated.      

      Cardiovascular:  Regular rate and rhythm.  No pulse deficits. Respiratory:  Clear           

      bilateral breath sounds.  No retractions.  No increased work of breathing, no               

      retractions or nasal flaring. Skin:  Warm, dry with normal turgor.  Normal color with       

      no rashes, no lesions, and no evidence of cellulitis. MS/ Extremity:  Pulses equal, no      

      cyanosis.  Neuro:  Awake and alert, GCS 15                                                  

                                                                                                  

Vital Signs:                                                                                      

14:13  / 82; Pulse 84; Resp 17; Temp 98.6(TE); Pulse Ox 97% on R/A; Weight 145.15 kg;   tw2 

      Height 5 ft. 5 in. (165.10 cm); Pain 9/10;                                                  

15:59  / 84; Pulse 82; Resp 18; Pulse Ox 100% on R/A;                                   ld1 

16:43  / 80; Pulse 76; Resp 18; Pulse Ox 100% on R/A;                                   ld1 

14:13 Body Mass Index 53.25 (145.15 kg, 165.10 cm)                                            tw2 

                                                                                                  

MDM:                                                                                              

15:52 Patient medically screened.                                                             rn  

17:03 Differential diagnosis: acute pericarditis, chest wall pain, costochondritis,           rn  

      pericarditis, pneumonia, pneumothorax. Data reviewed: vital signs, nurses notes, old        

      medical records, radiologic studies, plain films, and as a result, I will discharge         

      patient. Counseling: I had a detailed discussion with the patient and/or guardian           

      regarding: the historical points, exam findings, and any diagnostic results supporting      

      the discharge/admit diagnosis, radiology results, the need for outpatient follow up, to     

      return to the emergency department if symptoms worsen or persist or if there are any        

      questions or concerns that arise at home. Special discussion: Based on the patient's        

      history, exam, and Dx evaluation, there is no indication for emergent intervention or       

      inpatient Tx. It is understood by the patient/guardian that if the Sx's persist or          

      worsen they need to return immediately for re-evaluation. I discussed with the              

      patient/guardian in detail that at this point there is no indication for admission to       

      the hospital. It is understood, however, that if the symptoms persist or worsen the         

      patient needs to return immediately for re-evaluation. ED course: CXR clear of acute        

      infiltrate, already on abx, no oxygen requirement, will dc home with OTC                    

      anti-inflammatories. .                                                                      

                                                                                                  

                                                                                             

16:02 Order name: XRAY Chest Pa And Lat (2 Views)                                             rn  

                                                                                             

16:50 Order name: RAD; Complete Time: 17:04                                                   EDMS

                                                                                                  

Administered Medications:                                                                         

17:11 Drug: TORadol (ketorolac) 30 mg Route: IM; Site: right deltoid;                         ld1 

17:11 Follow up: Response: No adverse reaction                                                ld1 

                                                                                                  

                                                                                                  

Disposition:                                                                                      

21 17:04 Discharged to Home. Impression: Chest pain, unspecified, Cough.                    

- Condition is Stable.                                                                            

- Discharge Instructions: Nonspecific Chest Pain, Chest Wall Pain, Cough, Adult.                  

                                                                                                  

- Medication Reconciliation Form, Thank You Letter, Antibiotic Education, Prescription            

  Opioid Use, Work release form form.                                                             

- Follow up: Private Physician; When: As needed; Reason: Recheck today's complaints,              

  Re-evaluation by your physician.                                                                

- Problem is new.                                                                                 

- Symptoms have improved.                                                                         

                                                                                                  

                                                                                                  

                                                                                                  

Signatures:                                                                                       

Dispatcher [employer]                           EDMS                                                 

Manny Russo MD MD rn Wise, Tara, RN                          RN   2                                                  

Leonila Chicas RN                     RN   ld1                                                  

                                                                                                  

Corrections: (The following items were deleted from the chart)                                    

17:12 17:04 2021 17:04 Discharged to Home. Impression: Chest pain, unspecified; Cough.  ld1 

      Condition is Stable. Forms are Medication Reconciliation Form, Thank You Letter,            

      Antibiotic Education, Prescription Opioid Use. Follow up: Private Physician; When: As       

      needed; Reason: Recheck today's complaints, Re-evaluation by your physician. Problem is     

      new. Symptoms have improved. rn                                                             

                                                                                                  

**************************************************************************************************

## 2021-06-23 NOTE — ER
Nurse's Notes                                                                                     

 [facility]                                                                 

Name: Nora Elizondo                                                                          

Age: 25 yrs                                                                                       

Sex: Female                                                                                       

: 1995                                                                                   

MRN: [id number]                                                                                   

Arrival Date: 2021                                                                          

Time: 14:05                                                                                       

Account#: [id number]                                                                            

Bed 30                                                                                            

Private MD:                                                                                       

Diagnosis: Chest pain, unspecified;Cough                                                          

                                                                                                  

Presentation:                                                                                     

                                                                                             

14:13 Chief complaint: Patient states: i am having really bad chest pain. i was here early    tw2 

       morning. i was negative for strep. my nose is running. i am coughing.                

      Coronavirus screen: cough unrelated to allergies, difficulty breathing, runny nose,         

      Client presents with at least one sign or symptom that may indicate coronavirus-19.         

      Standard/surgical mask placed on the client. Provider contacted for isolation               

      considerations. Ebola Screen: Patient denies travel to an Ebola-affected area in the 21     

      days before illness onset. Initial Sepsis Screen: Does the patient meet any 2 criteria?     

      No. Patient's initial sepsis screen is negative. Does the patient have a suspected          

      source of infection? No. Patient's initial sepsis screen is negative. Risk Assessment:      

      Do you want to hurt yourself or someone else? Patient reports no desire to harm self or     

      others. Onset of symptoms was 2021.                                                

14:13 Method Of Arrival: Ambulatory                                                           tw2 

14:13 Acuity: FRANK 3                                                                           tw2 

                                                                                                  

Triage Assessment:                                                                                

14:16 General: Appears in no apparent distress. obese, well groomed, Behavior is calm,        tw2 

      cooperative, appropriate for age. Pain: Complains of pain in chest. EENT: Reports nasal     

      congestion nasal discharge. Cardiovascular: Reports shortness of breath. Respiratory:       

      Respiratory effort is even, unlabored.                                                      

                                                                                                  

OB/GYN:                                                                                           

17:12 LMP 2021                                                                            ld1 

                                                                                                  

Historical:                                                                                       

- Allergies:                                                                                      

14:16 No Known Allergies;                                                                     tw2 

- Home Meds:                                                                                      

14:16 None [Active];                                                                          tw2 

- PMHx:                                                                                           

14:16 None;                                                                                   tw2 

- PSHx:                                                                                           

14:16 None;                                                                                   tw2 

                                                                                                  

- Immunization history:: Adult Immunizations.                                                     

- Social history:: Smoking status: Patient denies any tobacco usage or history of.                

- Family history:: not pertinent.                                                                 

- Hospitalizations: : No recent hospitalization is reported.                                      

                                                                                                  

                                                                                                  

Screening:                                                                                        

15:59 Abuse screen: Denies threats or abuse. Denies injuries from another. Nutritional        ld1 

      screening: No deficits noted. Tuberculosis screening: No symptoms or risk factors           

      identified. Fall Risk None identified.                                                      

                                                                                                  

Assessment:                                                                                       

15:59 General: Appears in no apparent distress. comfortable, Behavior is calm, cooperative,   ld1 

      appropriate for age. Pain: Denies pain. Neuro: Level of Consciousness is awake, alert,      

      obeys commands, Oriented to person, place, time, situation, Appropriate for age.            

      Cardiovascular: Capillary refill < 3 seconds Patient's skin is warm and dry.                

      Cardiovascular: Reports Chest pressure Rhythm is regular. Respiratory: Airway is patent     

      Respiratory effort is even, unlabored, Respiratory pattern is regular, symmetrical.         

      Respiratory: GI: Abdomen is round non-distended. : No signs and/or symptoms were          

      reported regarding the genitourinary system. EENT: Reports nasal discharge that is          

      watery. Derm: No signs and/or symptoms reported regarding the dermatologic system.          

16:43 Reassessment: Patient appears in no apparent distress at this time. No changes from     ld1 

      previously documented assessment. Waiting on results with family at bedside.                

17:12 Pain: Pain began.                                                                       ld1 

                                                                                                  

Vital Signs:                                                                                      

14:13  / 82; Pulse 84; Resp 17; Temp 98.6(TE); Pulse Ox 97% on R/A; Weight 145.15 kg;   tw2 

      Height 5 ft. 5 in. (165.10 cm); Pain 9/10;                                                  

15:59  / 84; Pulse 82; Resp 18; Pulse Ox 100% on R/A;                                   ld1 

16:43  / 80; Pulse 76; Resp 18; Pulse Ox 100% on R/A;                                   ld1 

14:13 Body Mass Index 53.25 (145.15 kg, 165.10 cm)                                            tw2 

                                                                                                  

ED Course:                                                                                        

14:05 Patient arrived in ED.                                                                  mr  

14:16 Triage completed.                                                                       tw2 

14:17 Arm band placed on.                                                                     tw2 

15:52 Manny Russo MD is Attending Physician.                                                rn  

15:59 Leonila Chicas RN is Primary Nurse.                                                   ld1 

15:59 Patient has correct armband on for positive identification. Bed in low position. Call   ld1 

      light in reach. Side rails up X 1. Pulse ox on. NIBP on.                                    

15:59 No provider procedures requiring assistance completed. Patient maintains SpO2           ld1 

      saturation greater than 95% on room air.                                                    

17:12 Patient did not have IV access during this emergency room visit.                        ld1 

                                                                                                  

Administered Medications:                                                                         

17:11 Drug: TORadol (ketorolac) 30 mg Route: IM; Site: right deltoid;                         ld1 

17:11 Follow up: Response: No adverse reaction                                                ld1 

                                                                                                  

                                                                                                  

Outcome:                                                                                          

17:04 Discharge ordered by MD.                                                                rn  

17:12 Discharged to home ambulatory.                                                          ld1 

17:12 Condition: stable                                                                           

17:12 Discharge instructions given to patient, family, Instructed on discharge instructions,      

      follow up and referral plans. medication usage, Demonstrated understanding of               

      instructions, follow-up care.                                                               

17:12 Patient left the ED.                                                                    ld1 

                                                                                                  

Signatures:                                                                                       

Miroslava Malone Roman, MD MD   rn                                                   

Brianna Luna RN                          RN   tw2                                                  

Leonila Chicas RN                     RN   ld1                                                  

                                                                                                  

**************************************************************************************************

## 2024-12-19 ENCOUNTER — HOSPITAL ENCOUNTER (EMERGENCY)
Dept: HOSPITAL 97 - ER | Age: 29
Discharge: HOME | End: 2024-12-19
Payer: SELF-PAY

## 2024-12-19 VITALS — OXYGEN SATURATION: 100 %

## 2024-12-19 VITALS — SYSTOLIC BLOOD PRESSURE: 120 MMHG | DIASTOLIC BLOOD PRESSURE: 71 MMHG

## 2024-12-19 VITALS — TEMPERATURE: 98.2 F

## 2024-12-19 DIAGNOSIS — R63.2: ICD-10-CM

## 2024-12-19 DIAGNOSIS — K52.9: Primary | ICD-10-CM

## 2024-12-19 LAB
ALBUMIN SERPL BCP-MCNC: 3.6 G/DL (ref 3.4–5)
ALBUMIN/GLOB SERPL: 0.9 {RATIO} (ref 1.1–1.8)
ALP SERPL-CCNC: 66 U/L (ref 45–117)
ALT SERPL W P-5'-P-CCNC: 29 U/L (ref 13–56)
ANION GAP SERPL CALC-SCNC: 6.9 MEQ/L (ref 5–15)
AST SERPL W P-5'-P-CCNC: 18 U/L (ref 15–37)
BUN BLD-MCNC: 16 MG/DL (ref 7–18)
GLOBULIN SER CALC-MCNC: 4 G/DL (ref 2.3–3.5)
GLUCOSE SERPLBLD-MCNC: 126 MG/DL (ref 74–106)
HCT VFR BLD CALC: 38.2 % (ref 36–45)
HGB BLD-MCNC: 11.9 G/DL (ref 12–15)
LIPASE SERPL-CCNC: 24 U/L (ref 13–75)
LYMPHOCYTES # SPEC AUTO: 1.7 K/UL (ref 0.7–4.9)
MCH RBC QN AUTO: 26.1 PG (ref 27–35)
MCHC RBC AUTO-ENTMCNC: 31.3 G/DL (ref 32–36)
MCV RBC: 83.5 FL (ref 80–100)
NRBC # BLD: 0 10*3/UL (ref 0–0)
NRBC BLD AUTO-RTO: 0.1 % (ref 0–0)
PMV BLD: 9.5 FL (ref 7.6–11.3)
POTASSIUM SERPL-SCNC: 3.9 MEQ/L (ref 3.5–5.1)
RBC # BLD: 4.57 M/UL (ref 3.86–4.86)
SQUAMOUS URNS QL MICRO: <5 /HPF
UA COMPLETE W REFLEX CULTURE PNL UR: (no result)
UA DIPSTICK W REFLEX MICRO PNL UR: (no result)
WBC # BLD AUTO: 11 THOU/UL (ref 4.3–10.9)

## 2024-12-19 PROCEDURE — 80053 COMPREHEN METABOLIC PANEL: CPT

## 2024-12-19 PROCEDURE — 96361 HYDRATE IV INFUSION ADD-ON: CPT

## 2024-12-19 PROCEDURE — 96375 TX/PRO/DX INJ NEW DRUG ADDON: CPT

## 2024-12-19 PROCEDURE — 36415 COLL VENOUS BLD VENIPUNCTURE: CPT

## 2024-12-19 PROCEDURE — 81025 URINE PREGNANCY TEST: CPT

## 2024-12-19 PROCEDURE — 99284 EMERGENCY DEPT VISIT MOD MDM: CPT

## 2024-12-19 PROCEDURE — 81001 URINALYSIS AUTO W/SCOPE: CPT

## 2024-12-19 PROCEDURE — 83690 ASSAY OF LIPASE: CPT

## 2024-12-19 PROCEDURE — 96374 THER/PROPH/DIAG INJ IV PUSH: CPT

## 2024-12-19 PROCEDURE — 85025 COMPLETE CBC W/AUTO DIFF WBC: CPT

## 2024-12-19 NOTE — XMS REPORT
Continuity of Care Document



                          Created on: 2024





OLLIE FELIPE

External Reference #: [id number]

: 1995

Sex: Female



Demographics





                                        Address             [address] [place]  [address]

 

                                        Home Phone          [phone]

 

                                        Work Phone          [phone]

 

                                        Mobile Phone        [phone] )

 

                                        Email Address       [address]

 

                                        Preferred Language  en

 

                                        Marital Status      Unknown

 

                                        Anglican Affiliation Unknown

 

                                        Race                Unknown

 

                                        Additional Race(s)  Unavailable

White

 

                                        Ethnic Group        Unknown





Author





                                        Name                Unknown

 

                                        Address             [address]

[place]  [address]

 

                                        [facility]

[facility]

 

                                        Address             [address]

[place]  [address]

 

                                        Phone               [phone]





Care Team Providers





                                Care Team Member Name Role            Phone

 

                                Rea Szymanski Primary Care Physician [phone]

[phone]

 

                                Karla Melissa    Attending Clinician [phone]

[phone]

 

                                Rea Szymanski Attending Clinician [phone]

 

                                Doctor Unassigned, No Name Attending Clinician U

navailable







Payers





                    Payer Name Policy Type Policy Number Effective Date Expirati

on Date Source







Problems





                                                    Condition 

Name                                    Condition 

Details                                 Condition 

Category                  Status                    Onset 

Date                                    Resolution 

Date                                    Last 

Treatment 

Date                                    Treating 

Clinician                 Comments                  Source

 

                                                    Need for 

varicella 

vaccine                                 Need for 

varicella 

vaccine             Disease             Active               

00:00:

00                                                               [facility]

 

                                                    Need for 

varicella 

vaccine                                 Need for 

varicella 

vaccine             Disease             Active               

00:00:

00                                                               [facility]

 

                                                    Nexplanon 

in place                                Nexplanon 

in place            Disease             Active               

00:00:

00                                                              Overview: 

Removal 

date 

21                                 [facility]

 

                                                    Dysmenorrh

ea                                      Dysmenorrh

ea                  Disease             Active              2016-0

3-18 

00:00:

00                                                               [facility]

 

                                                    Morbid 

obesity                                 Morbid 

obesity             Disease             Active               

00:00:

00                                                              Overview: 

ICD10 

Diagnosis 

Term 

Replacer 

Utility                                 [facility]

 

                                                    Contracept

alana 

management                              Contracept

alana 

management                Disease                   Resolve

d                                       2016-0

3-18 

00:00:

00                                      2018 

00:00:00                                2018 

08:04:01                                                    [facility]

 

                                                    Depot 

contracept

ion                                     Depot 

contracept

ion                       Disease                   Resolve

d                                       

3-18 

00:00:

00                                      2018 

00:00:00                                2018 

08:04:03                                                    [facility]







Social History





                    Social Habit Start Date Stop Date Quantity  Comments  Source

 

                    [facility]                                         [facility]

 

                                                    History of Social 

function                                2020 

00:00:00                                2020 

00:00:00                                                    [facility]

 

                                                    Alcoholic beverage 

intake                                  2019 

00:00:00                                2019 

00:00:00            0 /d                                    [facility]

 

                                        Alcohol intake      2019 

00:00:00                                2019 

00:00:00                                Current 

non-drinker of 

alcohol 

(finding)                                           [facility]

 

                                                    Tobacco use and 

exposure                                2016 

00:00:00                                2016 

00:00:00                                Smokeless 

tobacco non-user                                    [facility]

 

                                                    Sex assigned at 

birth                                   1995 

00:00:00                                1995 

00:00:00                                                    [facility]







                          Smoking Status Start Date   Stop Date    Source

 

                          Never smoked tobacco                           [facility]







Medications





                                                    Ordered 

Medication 

Name                                    Filled 

Medication 

Name                                    Start 

Date                                    Stop 

Date                                    Current 

Medication?                             Ordering 

Clinician       Indication      Dosage          Frequency       Signature 

(SIG)               Comments            Components          Source

 

                                                    norgestimat

e-ethinyl 

estradiol 

(FEMYNOR) 

0.25-35 

mg-mcg per 

tablet                                               

13:06:

18                  Yes                           1{tbl}              Take 1 

tablet by 

mouth 

daily.                                                      [facility]

 

                                                    norgestimat

e-ethinyl 

estradiol 

(FEMYNOR) 

0.25-35 

mg-mcg per 

tablet                                               

08:06:

18                  Yes                           1{tbl}              Take 1 

tablet by 

mouth 

daily.                                                      [facility]







Immunizations





                                                    Ordered 

Immunization Name                       Filled Immunization 

Name            Date            Status          Comments        Source

 

                                HPV9                            2018 

00:00:00            Completed                               [facility]

 

                                HPV9                            2018 

00:00:00            Completed                               [facility]

 

                                HPV9                            2018 

00:00:00            Completed                               [facility]

 

                                HPV9                            2018 

00:00:00            Completed                               [facility]

 

                                                    TDAP (ADACEL) 

VACCINE                                             2012 

00:00:00            Completed                               [facility]

 

                                                    TDAP (ADACEL) 

VACCINE                                             2012 

00:00:00            Completed                               [facility]

 

                                                    TDAP (ADACEL) 

VACCINE                                             2012 

00:00:00            Completed                               [facility]

 

                                                    TDAP (ADACEL) 

VACCINE                                             2012 

00:00:00            Completed                               [facility]







Vital Signs





                      Vital Name Observation Time Observation Value Ruth franco

 

                                                    Systolic blood 

pressure        2019 13:22:00 121 mm[Hg]                      [facility]

 

                                                    Diastolic blood 

pressure        2019 13:22:00 84 mm[Hg]                       [facility]

 

                      Heart rate 2019 13:22:00 81 /min               [facility]

[facility]

 

                      Body temperature 2019 13:22:00 36.78 Kasey            

 [facility]

 

                      Respiratory rate 2019 13:22:00 16 /min              

 [facility]

 

                      Body height 2019 13:22:00 165.1 cm              [facility]

 

                      Body weight 2019 13:22:00 134.888 kg            [facility]

 

                      BMI        2019 13:22:00 49.49 kg/m2            [facility]

 

                                                    Systolic blood 

pressure        2019 13:22:00 121 mm[Hg]                      [facility] o

[facility]

 

                                                    Diastolic blood 

pressure        2019 13:22:00 84 mm[Hg]                       [facility]

 

                      Heart rate 2019 13:22:00 81 /min               [facility]

[facility]

 

                      Body temperature 2019 13:22:00 36.78 Kasey            

 [facility]

 

                      Respiratory rate 2019 13:22:00 16 /min              

 [facility]

 

                      Body height 2019 13:22:00 165.1 cm              [facility]

 

                      Body weight 2019 13:22:00 134.888 kg            [facility]

 

                      BMI        2019 13:22:00 49.49 kg/m2            [facility]







Procedures





                          Procedure    Date / Time Performed Performing Clinicia

n Source

 

                                ASSIGNMENT OF BENEFITS 2019 13:00:08 Docto

r Unassigned, No 

Name                                    [facility]







Encounters





                                                    Start 

Date/Time                               End 

Date/Time                               Encounter 

Type                                    Admission 

Type                                    Attending 

Clinicians                              Care 

Facility                                Care 

Department                              Encounter 

ID                                      Source

 

                                                    2024-12-10 

00:00:00                                2024-12-10 

08:24:54                                Letter 

(Out)                                               Karla Melissa                                    [facility] 

[place] 

(ABELINO)                                  [id number]

[id number]

[id number]

[phone]

[id number]                       [id number]                 [facility]

 

                                                    2024 

11:38:50                                2024 

11:38:50   Outpatient                       SFA        SFA        [id number]

[id number]                                   [facility]

 

                                                    2023 

13:17:56                                2023 

13:17:56   Outpatient                       SFA        SFA        [id number]

[id number]                                   Americo MONROE 

Chris

 

                                                    2023 

13:14:49                                2023 

13:14:49   Outpatient                       SFA        SFA        [id number]

[id number]                                   [facility] Perez

 

                                                    2023-02-10 

14:13:12                                2023-02-10 

14:13:12   Outpatient                       SFA        [facility]        [id number]

[id number]                                   Americo Perez

 

                                                    2021 

00:00:00                                2021 

00:00:00            Telephone                               Rea Reagan                              [facility] 

OB/GYN 

[facility] 

MATERNAL 

& CHILD 

HEALTH 

[facility]                                [id number]

[id number]

[id number]

[phone]

[id number]                       [id number]                  [facility]

 

                                                    2021 

00:00:00                                2021 

00:00:00            Telephone                               Rea Reagan                              [facility] 

OB/GYN 

[facility] 

MATERNAL 

& CHILD 

[facility]                                [id number]

[id number]

[id number]

[id number]

[id number]                       [id number]                  

 

                                                    2019 

08:05:47                                2019 

08:44:23                                Office 

Visit                                               Rea Reagan                              [facility] 

OB/GYN 

[facility] 

& CHILD 

[facility]                                [id number]

[id number]

[id number]

[phone]

107                       [id number]                  [facility]

 

                                                    2019 

08:05:47                                2019 

08:44:23                                Office 

Visit                                               Rea Reagan                              [facility] 

OB/GYN 

[facility] 

MATERNAL 

& CHILD 

HEALTH 

[facility]                                [id number]

[id number]

[id number]

[phone]

107                       [id number]                  

 

                                                    2019 

00:00:00                                2019 

00:00:00                                Orders 

Only                                                Doctor 

Unassigned, 

No Name                                 [facility]                                [id number]

[id number]

[id number]

[phone]

[id number]                       [id number]                  [facility]

## 2024-12-19 NOTE — EDPHYS
Physician Documentation                                                                           

 [facility]                                                                 

Name: Nora Elizondo                                                                          

Age: 28 yrs                                                                                       

Sex: Female                                                                                       

: 1995                                                                                   

MRN: [id number]                                                                                   

Arrival Date: 2024                                                                          

Time: 08:00                                                                                       

Account#: [id number]                                                                            

Bed 5                                                                                             

Private MD:                                                                                       

ED Physician Evans Major                                                                        

HPI:                                                                                              

                                                                                             

08:23 This 28 yrs old  Female presents to ER via Ambulatory with complaints of       ec2 

      Stomach pain.                                                                               

08:23 Patient arrives today for evaluation of abdominal discomfort. States that she "over ate ec2 

      ", states that she had more [employer] than she typically would. Patient reports some      

      nausea and vomiting. Patient reports upper abdominal discomfort as well. No alcohol         

      use, no substance abuse..                                                                   

                                                                                                  

OB/GYN:                                                                                           

10:56 LMP N/A - Birth control method, Not pregnant                                            ll1 

                                                                                                  

Historical:                                                                                       

- Allergies:                                                                                      

08:17 No Known Allergies;                                                                     hb  

                                                                                                  

- Immunization history:: Adult Immunizations up to date.                                          

- Infectious Disease History:: Denies.                                                            

- Social history:: Smoking status: Patient denies any tobacco usage or history of.                

                                                                                                  

                                                                                                  

ROS:                                                                                              

08:24 Constitutional: as per hpi                                                              ec2 

                                                                                                  

Exam:                                                                                             

08:24 Constitutional:  GEN: NAD                                                                   

Head: atraumatic                                                                                  

Eyes: EOMI                                                                                        

Ears: External ears are          ec2                                                              

      normal.                                                                                     

CV: regular rate                                                                                  

LUNGS: no respiratory distress                                                                    

ABD: non-distended, soft,                                                                         

      tender in epigastrium, not guarding, not rigid                                              

SKIN: no evidence of rashes                                                                       

MSK: no                                                                                           

      evidence of trauma                                                                          

                                                                                                  

Vital Signs:                                                                                      

08:15  / 74; Pulse 88; Resp 20; Temp 98.2(O); Pulse Ox 100% on R/A; Weight 136.08 kg;   hb  

      Height 5 ft. 5 in. ; Pain 10/10;                                                            

08:20  / 71; Pulse 48; Resp 18; Pulse Ox 97% on R/A;                                    db  

09:06 Pulse 61; Pulse Ox 94% ;                                                                ll1 

09:21 Pulse 63; Resp 17; Pulse Ox 100% on R/A;                                                ll1 

10:50  / 71; Pulse 61; Resp 16; Pulse Ox 100% on R/A;                                   ll1 

08:15 Body Mass Index 49.92 (136.08 kg, 165.1 cm)                                             hb  

08:15 Pain Scale: Adult                                                                       hb  

                                                                                                  

MDM:                                                                                              

08:23 Medical Screening Exam initiated                                                        ec2 

08:24 Data reviewed: vital signs, nurses notes. ED course: Patient arrives today for          ec2 

      abdominal discomfort along with nausea and vomiting. Examination is revealing for           

      nontoxic individual who has upper abdominal findings as above. Will obtain lab work,        

      urine studies, treat the patient symptoms. Differential includes gastroenteritis,           

      gastritis, pancreatitis, gallbladder pathology..                                            

10:21 ED course: Urine nonactionable, suspect gastroenteritis along with associated abdominal ec2 

      wall pain given frequent vomiting. Will discharge home. Return precautions given..          

                                                                                                  

                                                                                             

08:23 Order name: CBC with Diff; Complete Time: 09:15                                         ec2 

                                                                                             

08:23 Order name: CMP; Complete Time: 09:49                                                   ec2 

                                                                                             

08:23 Order name: Lipase; Complete Time: 09:49                                                ec2 

                                                                                             

08:23 Order name: Pregnancy Test, Urine; Complete Time: 10:21                                 ec2 

                                                                                             

08:23 Order name: Urinalysis w/ reflexes; Complete Time: 10:21                                ec2 

                                                                                             

08:23 Order name: IV Saline Lock; Complete Time: 08:25                                        ec2 

                                                                                             

08:23 Order name: Labs collected and sent; Complete Time: 08:25                               ec2 

                                                                                                  

Administered Medications:                                                                         

08:43 Drug: NS 0.9% IV 1000 ml IV at 1 bolus Per protocol; to be given as a bolus over 60     db  

      minutes Route: IV; Rate: 1 bolus; Site: left antecubital;                                   

09:58 Follow up: Response: No adverse reaction; IV Status: Completed infusion; IV Intake:     ll1 

      1000ml                                                                                      

08:44 Drug: TORadol - Ketorolac IVP 15 mg IVP once Route: IVP; Site: left antecubital;        db  

09:21 Follow up: Response: No adverse reaction; Pain is decreased                             ll1 

08:44 Drug: Ondansetron IVP 4 mg IVP once; over 2 minutes Route: IVP; Site: left antecubital; db  

09:21 Follow up: Response: No adverse reaction                                                ll1 

10:14 Drug: morphine IVP or IV 2 mg IVP once over 4 mins {Note: pain 9/10 RASS 0.} Route:     ll1 

      IVP; Infused Over: 4 mins; Site: left antecubital;                                          

10:57 Follow up: Response: No adverse reaction; Pain is decreased; RASS: Alert and Calm (0)   ll1 

10:14 Drug: metoCLOPramide IVP 10 mg IVP once; over 1 to 2 minutes Route: IVP; Site: left     ll1 

      antecubital;                                                                                

10:57 Follow up: Response: No adverse reaction; Nausea is decreased                           ll1 

10:15 Drug: Famotidine IVP 20 mg IVP once; dilute with 10 mL 0.9% NaCl; give over 2 minutes   ll1 

      Route: IVP; Site: left antecubital;                                                         

10:57 Follow up: Response: No adverse reaction                                                ll1 

                                                                                                  

                                                                                                  

Disposition Summary:                                                                              

24 10:43                                                                                    

Discharge Ordered                                                                                 

 Notes:       Location: Home                                                                        
  ec2

      Condition: Stable                                                                       ec2 

      Diagnosis                                                                                   

        - Upper abdominal pain, unspecified                                                   ec2 

        - Noninfective gastroenteritis and colitis, unspecified                               ec2 

        - Overeating                                                                          ec2 

      Followup:                                                                               ec2 

        - With: Private Physician                                                                  

        - When:                                                                                    

        - Reason: Re-evaluation by your physician                                                  

      Discharge Instructions:                                                                     

        - Discharge Summary Sheet                                                             ec2 

        - Gastritis, Adult, Easy-to-Read                                                      ec2 

      Forms:                                                                                      

        - Medication Reconciliation Form                                                      ec2 

        - Antibiotic Education                                                                ec2 

        - Prescription Opioid Use                                                             ec2 

        - Patient Portal Instructions                                                         ec2 

        - Leadership Thank You Letter                                                         ec2 

      Prescriptions:                                                                              

        - Pepcid 20 mg Oral Tablet                                                                 

            - take 1 tablet ORAL route once daily; 20 tablet; Refills: 0, Product Selection   ec2 

      Permitted                                                                                   

Signatures:                                                                                       

Dispatcher [employer]                           Lani Pineda RN RN hb Lewis, Lynsay RN                       RN   1                                                  

Kelly Barragan RN RN db Corral, Edwin, MD MD   ec2                                                  

                                                                                                  

**************************************************************************************************

## 2024-12-19 NOTE — ER
Nurse's Notes                                                                                     

 [facility]                                                                 

Name: Nora Elizondo                                                                          

Age: 28 yrs                                                                                       

Sex: Female                                                                                       

: 1995                                                                                   

MRN: [id number]                                                                                   

Arrival Date: 2024                                                                          

Time: 08:00                                                                                       

Account#: [id number]                                                                            

Bed 5                                                                                             

Private MD:                                                                                       

Diagnosis: Upper abdominal pain, unspecified;Noninfective gastroenteritis and colitis,            

  unspecified;Overeating                                                                          

                                                                                                  

Presentation:                                                                                     

                                                                                             

08:15 Chief complaint: Patient states: "I overate yesterday so I made myself throw up, it     hb  

      usually helps but I have been having pain and vomiting ever since 3 am.". Coronavirus       

      screen: At this time, the client does not indicate any symptoms associated with             

      coronavirus-19. Ebola Screen: No symptoms or risks identified at this time. Initial         

      Sepsis Screen: Does the patient meet any 2 criteria? No. Patient's initial sepsis           

      screen is negative. Does the patient have a suspected source of infection? No.              

      Patient's initial sepsis screen is negative. Risk Assessment: Do you want to hurt           

      yourself or someone else? Patient reports no desire to harm self or others. Onset of        

      symptoms was 2024 at 03:00.                                                    

08:15 Method Of Arrival: Ambulatory                                                           hb  

08:15 Acuity: FRANK 3                                                                           hb  

                                                                                                  

OB/GYN:                                                                                           

10:56 LMP N/A - Birth control method, Not pregnant                                            ll1 

                                                                                                  

Historical:                                                                                       

- Allergies:                                                                                      

08:17 No Known Allergies;                                                                     hb  

                                                                                                  

- Immunization history:: Adult Immunizations up to date.                                          

- Infectious Disease History:: Denies.                                                            

- Social history:: Smoking status: Patient denies any tobacco usage or history of.                

                                                                                                  

                                                                                                  

Screenin:48 [facility] ED Fall Risk Assessment (Adult) History of falling in the last 3 months,       db  

      including since admission No falls in past 3 months (0 pts) Confusion or Disorientation     

      No (0 pts) Intoxicated or Sedated No (0 pts) Impaired Gait No (0 pts) Mobility Assist       

      Device Used No (0 pt) Altered Elimination No (0 pt) Score/Fall Risk Level 0 - 2 = Low       

      Risk Oriented to surroundings, Maintained a safe environment. Abuse screen: Denies          

      threats or abuse. Denies injuries from another. Nutritional screening: No deficits          

      noted. Tuberculosis screening: No symptoms or risk factors identified.                      

                                                                                                  

Assessment:                                                                                       

08:40 Reassessment: Patient appears in no apparent distress at this time. Patient and/or      db  

      family updated on plan of care and expected duration. Pain level reassessed. Patient is     

      alert, oriented x 3, equal unlabored respirations, skin warm/dry/pink. General: Appears     

      in no apparent distress. uncomfortable, Behavior is calm, cooperative. Pain: Complains      

      of pain in back and abdomen. Neuro: Level of Consciousness is awake, alert, obeys           

      commands, Oriented to person, place, time, situation. Respiratory: Airway is patent         

      Respiratory effort is even, unlabored, Respiratory pattern is regular, symmetrical. GI:     

      Abdomen is non-distended, noted to have ascites, Reports lower abdominal pain, nausea.      

09:21 Reassessment: No changes from previously documented assessment. Patient and/or family   ll1 

      updated on plan of care and expected duration. Pain level reassessed. Patient is alert,     

      oriented x 3, equal unlabored respirations, skin warm/dry/pink. Patient states feeling      

      better.                                                                                     

10:50 Reassessment: No changes from previously documented assessment. Patient and/or family   ll1 

      updated on plan of care and expected duration. Pain level reassessed. Patient is alert,     

      oriented x 3, equal unlabored respirations, skin warm/dry/pink. Patient states feeling      

      better.                                                                                     

                                                                                                  

Vital Signs:                                                                                      

08:15  / 74; Pulse 88; Resp 20; Temp 98.2(O); Pulse Ox 100% on R/A; Weight 136.08 kg;   hb  

      Height 5 ft. 5 in. ; Pain 10/10;                                                            

08:20  / 71; Pulse 48; Resp 18; Pulse Ox 97% on R/A;                                    db  

09:06 Pulse 61; Pulse Ox 94% ;                                                                ll1 

09:21 Pulse 63; Resp 17; Pulse Ox 100% on R/A;                                                ll1 

10:50  / 71; Pulse 61; Resp 16; Pulse Ox 100% on R/A;                                   ll1 

08:15 Body Mass Index 49.92 (136.08 kg, 165.1 cm)                                             hb  

08:15 Pain Scale: Adult                                                                       hb  

                                                                                                  

ED Course:                                                                                        

08:03 Patient arrived in ED.                                                                  ra3 

08:10 Velma Cervantes, RN is Primary Nurse.                                                     ll1 

08:17 Triage completed.                                                                       hb  

08:17 Arm band placed on.                                                                     hb  

08:20 Evans Major MD is Attending Physician.                                               ec2 

08:25 Missed attempt(s): 22 gauge in right forearm. Bleeding controlled, band aid applied,    hb  

      catheter tip intact.                                                                        

08:32 Initial lab(s) drawn, by me, sent to lab.                                               hb  

08:40 Provided Education on: ER procedures and process.                                       ll1 

08:42 Inserted saline lock: 22 gauge in left antecubital area, using aseptic technique.       db  

      Flushed with 10 mL NS.                                                                      

08:48 Patient has correct armband on for positive identification. Bed in low position. Call   db  

      light in reach. Side rails up X 1. Pulse ox on. NIBP on. Pillow given.                      

10:05 Pregnancy Test, Urine Sent.                                                             hb  

10:05 Urinalysis w/ reflexes Sent.                                                            hb  

10:52 No provider procedures requiring assistance completed. IV discontinued, intact,         ll1 

      bleeding controlled, No redness/swelling at site. Pressure dressing applied.                

                                                                                                  

Administered Medications:                                                                         

08:43 Drug: NS 0.9% IV 1000 ml IV at 1 bolus Per protocol; to be given as a bolus over 60     db  

      minutes Route: IV; Rate: 1 bolus; Site: left antecubital;                                   

09:58 Follow up: Response: No adverse reaction; IV Status: Completed infusion; IV Intake:     ll1 

      1000ml                                                                                      

08:44 Drug: TORadol - Ketorolac IVP 15 mg IVP once Route: IVP; Site: left antecubital;        db  

09:21 Follow up: Response: No adverse reaction; Pain is decreased                             ll1 

08:44 Drug: Ondansetron IVP 4 mg IVP once; over 2 minutes Route: IVP; Site: left antecubital; db  

09:21 Follow up: Response: No adverse reaction                                                ll1 

10:14 Drug: morphine IVP or IV 2 mg IVP once over 4 mins {Note: pain 9/10 RASS 0.} Route:     ll1 

      IVP; Infused Over: 4 mins; Site: left antecubital;                                          

10:57 Follow up: Response: No adverse reaction; Pain is decreased; RASS: Alert and Calm (0)   ll1 

10:14 Drug: metoCLOPramide IVP 10 mg IVP once; over 1 to 2 minutes Route: IVP; Site: left     ll1 

      antecubital;                                                                                

10:57 Follow up: Response: No adverse reaction; Nausea is decreased                           ll1 

10:15 Drug: Famotidine IVP 20 mg IVP once; dilute with 10 mL 0.9% NaCl; give over 2 minutes   ll1 

      Route: IVP; Site: left antecubital;                                                         

10:57 Follow up: Response: No adverse reaction                                                ll1 

                                                                                                  

                                                                                                  

Medication:                                                                                       

08:48 VIS not applicable for this client.                                                     db  

                                                                                                  

Intake:                                                                                           

09:58 IV: 1000ml; Total: 1000ml.                                                              ll1 

                                                                                                  

Outcome:                                                                                          

10:43 Discharge ordered by MD.                                                                ec2 

10:53 Patient left the ED.                                                                    ll1 

10:53 Discharged to home via wheelchair,                                                      ll1 

10:53 Condition: stable                                                                           

10:53 Discharge instructions given to patient, family, Instructed on discharge instructions,      

      follow up and referral plans. medication usage, Demonstrated understanding of               

      instructions, follow-up care, medications, Prescriptions given X 1,                         

                                                                                                  

Signatures:                                                                                       

Lani Breaux RN                     VEL                                                      

Velma Cervantes RN                       RN   ll1                                                  

Kelly Barragan RN                    RN   Evans Hollingsworth MD MD ec2                                                  

Ely Corbett                                   ra3                                                  

                                                                                                  

Corrections: (The following items were deleted from the chart)                                    

08:49 08:15  / 71; Pulse 48bpm; Resp 18bpm; Pulse Ox 97% RA; db                         db  

                                                                                                  

**************************************************************************************************